# Patient Record
Sex: FEMALE | Race: WHITE | NOT HISPANIC OR LATINO | Employment: UNEMPLOYED | URBAN - METROPOLITAN AREA
[De-identification: names, ages, dates, MRNs, and addresses within clinical notes are randomized per-mention and may not be internally consistent; named-entity substitution may affect disease eponyms.]

---

## 2020-09-01 ENCOUNTER — HOSPITAL ENCOUNTER (OUTPATIENT)
Dept: RADIOLOGY | Facility: HOSPITAL | Age: 45
Discharge: HOME | End: 2020-09-01
Attending: RADIOLOGY

## 2020-09-01 DIAGNOSIS — Z76.89 REFERRAL OF PATIENT WITHOUT EXAMINATION OR TREATMENT: ICD-10-CM

## 2020-09-08 ENCOUNTER — HOSPITAL ENCOUNTER (EMERGENCY)
Facility: HOSPITAL | Age: 45
Discharge: HOME | End: 2020-09-08
Attending: EMERGENCY MEDICINE | Admitting: EMERGENCY MEDICINE
Payer: COMMERCIAL

## 2020-09-08 ENCOUNTER — TELEPHONE (OUTPATIENT)
Dept: OBGYN CLINIC | Facility: CLINIC | Age: 45
End: 2020-09-08

## 2020-09-08 VITALS
HEART RATE: 75 BPM | TEMPERATURE: 97.9 F | DIASTOLIC BLOOD PRESSURE: 71 MMHG | SYSTOLIC BLOOD PRESSURE: 122 MMHG | RESPIRATION RATE: 16 BRPM | OXYGEN SATURATION: 99 % | WEIGHT: 190 LBS

## 2020-09-08 DIAGNOSIS — R42 POSITIONAL LIGHTHEADEDNESS: ICD-10-CM

## 2020-09-08 DIAGNOSIS — N93.9 ABNORMAL VAGINAL BLEEDING: Primary | ICD-10-CM

## 2020-09-08 LAB
ALBUMIN SERPL BCP-MCNC: 3.8 G/DL (ref 3.5–5)
ALP SERPL-CCNC: 68 U/L (ref 46–116)
ALT SERPL W P-5'-P-CCNC: 18 U/L (ref 12–78)
ANION GAP SERPL CALCULATED.3IONS-SCNC: 4 MMOL/L (ref 4–13)
APTT PPP: 25 SECONDS (ref 23–37)
AST SERPL W P-5'-P-CCNC: 9 U/L (ref 5–45)
ATRIAL RATE: 79 BPM
BACTERIA UR QL AUTO: NORMAL /HPF
BASOPHILS # BLD AUTO: 0.06 THOUSANDS/ΜL (ref 0–0.1)
BASOPHILS NFR BLD AUTO: 1 % (ref 0–1)
BILIRUB DIRECT SERPL-MCNC: <0.05 MG/DL (ref 0–0.2)
BILIRUB SERPL-MCNC: 0.26 MG/DL (ref 0.2–1)
BILIRUB UR QL STRIP: NEGATIVE
BUN SERPL-MCNC: 10 MG/DL (ref 5–25)
CALCIUM SERPL-MCNC: 9 MG/DL (ref 8.3–10.1)
CHLORIDE SERPL-SCNC: 112 MMOL/L (ref 100–108)
CLARITY UR: CLEAR
CO2 SERPL-SCNC: 24 MMOL/L (ref 21–32)
COLOR UR: YELLOW
COLOR, POC: NORMAL
CREAT SERPL-MCNC: 0.65 MG/DL (ref 0.6–1.3)
EOSINOPHIL # BLD AUTO: 0.12 THOUSAND/ΜL (ref 0–0.61)
EOSINOPHIL NFR BLD AUTO: 2 % (ref 0–6)
ERYTHROCYTE [DISTWIDTH] IN BLOOD BY AUTOMATED COUNT: 24.3 % (ref 11.6–15.1)
EXT PREG TEST URINE: NEGATIVE
EXT. CONTROL ED NAV: NORMAL
GFR SERPL CREATININE-BSD FRML MDRD: 108 ML/MIN/1.73SQ M
GLUCOSE SERPL-MCNC: 87 MG/DL (ref 65–140)
GLUCOSE UR STRIP-MCNC: NEGATIVE MG/DL
HCT VFR BLD AUTO: 32.6 % (ref 34.8–46.1)
HGB BLD-MCNC: 9.9 G/DL (ref 11.5–15.4)
HGB UR QL STRIP.AUTO: ABNORMAL
HYALINE CASTS #/AREA URNS LPF: NORMAL /LPF
IMM GRANULOCYTES # BLD AUTO: 0.02 THOUSAND/UL (ref 0–0.2)
IMM GRANULOCYTES NFR BLD AUTO: 0 % (ref 0–2)
INR PPP: 0.99 (ref 0.84–1.19)
KETONES UR STRIP-MCNC: NEGATIVE MG/DL
LEUKOCYTE ESTERASE UR QL STRIP: NEGATIVE
LIPASE SERPL-CCNC: 67 U/L (ref 73–393)
LYMPHOCYTES # BLD AUTO: 1.62 THOUSANDS/ΜL (ref 0.6–4.47)
LYMPHOCYTES NFR BLD AUTO: 22 % (ref 14–44)
MAGNESIUM SERPL-MCNC: 2.1 MG/DL (ref 1.6–2.6)
MCH RBC QN AUTO: 25.4 PG (ref 26.8–34.3)
MCHC RBC AUTO-ENTMCNC: 30.4 G/DL (ref 31.4–37.4)
MCV RBC AUTO: 84 FL (ref 82–98)
MONOCYTES # BLD AUTO: 0.63 THOUSAND/ΜL (ref 0.17–1.22)
MONOCYTES NFR BLD AUTO: 9 % (ref 4–12)
NEUTROPHILS # BLD AUTO: 5 THOUSANDS/ΜL (ref 1.85–7.62)
NEUTS SEG NFR BLD AUTO: 66 % (ref 43–75)
NITRITE UR QL STRIP: NEGATIVE
NON-SQ EPI CELLS URNS QL MICRO: NORMAL /HPF
NRBC BLD AUTO-RTO: 0 /100 WBCS
P AXIS: 67 DEGREES
PH UR STRIP.AUTO: 5.5 [PH] (ref 4.5–8)
PLATELET # BLD AUTO: 245 THOUSANDS/UL (ref 149–390)
PMV BLD AUTO: 10.7 FL (ref 8.9–12.7)
POTASSIUM SERPL-SCNC: 3.7 MMOL/L (ref 3.5–5.3)
PR INTERVAL: 108 MS
PROT SERPL-MCNC: 7.2 G/DL (ref 6.4–8.2)
PROT UR STRIP-MCNC: NEGATIVE MG/DL
PROTHROMBIN TIME: 13.1 SECONDS (ref 11.6–14.5)
QRS AXIS: 86 DEGREES
QRSD INTERVAL: 86 MS
QT INTERVAL: 360 MS
QTC INTERVAL: 412 MS
RBC # BLD AUTO: 3.89 MILLION/UL (ref 3.81–5.12)
RBC #/AREA URNS AUTO: NORMAL /HPF
SODIUM SERPL-SCNC: 140 MMOL/L (ref 136–145)
SP GR UR STRIP.AUTO: <=1.005 (ref 1–1.03)
T WAVE AXIS: 55 DEGREES
UROBILINOGEN UR QL STRIP.AUTO: 0.2 E.U./DL
VENTRICULAR RATE: 79 BPM
WBC # BLD AUTO: 7.45 THOUSAND/UL (ref 4.31–10.16)
WBC #/AREA URNS AUTO: NORMAL /HPF

## 2020-09-08 PROCEDURE — 83735 ASSAY OF MAGNESIUM: CPT | Performed by: EMERGENCY MEDICINE

## 2020-09-08 PROCEDURE — 96374 THER/PROPH/DIAG INJ IV PUSH: CPT

## 2020-09-08 PROCEDURE — 99284 EMERGENCY DEPT VISIT MOD MDM: CPT | Performed by: EMERGENCY MEDICINE

## 2020-09-08 PROCEDURE — 81001 URINALYSIS AUTO W/SCOPE: CPT

## 2020-09-08 PROCEDURE — 85730 THROMBOPLASTIN TIME PARTIAL: CPT | Performed by: EMERGENCY MEDICINE

## 2020-09-08 PROCEDURE — 80076 HEPATIC FUNCTION PANEL: CPT | Performed by: EMERGENCY MEDICINE

## 2020-09-08 PROCEDURE — 96375 TX/PRO/DX INJ NEW DRUG ADDON: CPT

## 2020-09-08 PROCEDURE — 83690 ASSAY OF LIPASE: CPT | Performed by: EMERGENCY MEDICINE

## 2020-09-08 PROCEDURE — 85025 COMPLETE CBC W/AUTO DIFF WBC: CPT | Performed by: EMERGENCY MEDICINE

## 2020-09-08 PROCEDURE — 96361 HYDRATE IV INFUSION ADD-ON: CPT

## 2020-09-08 PROCEDURE — 99284 EMERGENCY DEPT VISIT MOD MDM: CPT

## 2020-09-08 PROCEDURE — 80048 BASIC METABOLIC PNL TOTAL CA: CPT | Performed by: EMERGENCY MEDICINE

## 2020-09-08 PROCEDURE — 93010 ELECTROCARDIOGRAM REPORT: CPT | Performed by: INTERNAL MEDICINE

## 2020-09-08 PROCEDURE — 85610 PROTHROMBIN TIME: CPT | Performed by: EMERGENCY MEDICINE

## 2020-09-08 PROCEDURE — 81025 URINE PREGNANCY TEST: CPT | Performed by: EMERGENCY MEDICINE

## 2020-09-08 PROCEDURE — 93005 ELECTROCARDIOGRAM TRACING: CPT

## 2020-09-08 PROCEDURE — 36415 COLL VENOUS BLD VENIPUNCTURE: CPT | Performed by: EMERGENCY MEDICINE

## 2020-09-08 RX ORDER — KETOROLAC TROMETHAMINE 30 MG/ML
15 INJECTION, SOLUTION INTRAMUSCULAR; INTRAVENOUS ONCE
Status: COMPLETED | OUTPATIENT
Start: 2020-09-08 | End: 2020-09-08

## 2020-09-08 RX ADMIN — SODIUM CHLORIDE 1000 ML: 0.9 INJECTION, SOLUTION INTRAVENOUS at 11:34

## 2020-09-08 RX ADMIN — TRANEXAMIC ACID 1000 MG: 1 INJECTION, SOLUTION INTRAVENOUS at 14:08

## 2020-09-08 RX ADMIN — KETOROLAC TROMETHAMINE 15 MG: 30 INJECTION, SOLUTION INTRAMUSCULAR at 11:34

## 2020-09-08 NOTE — ED ATTENDING ATTESTATION
Final Diagnosis:  1  Abnormal vaginal bleeding    2  Positional lightheadedness      ED Course as of Sep 08 1640   Tue Sep 08, 2020   1321 Case was discussed with OBGYN who suggested TXA IV bolus, +/- depot shot, f/u in clinic  Caio Kimball MD, saw and evaluated the patient  All available labs and X-rays were ordered by me or the resident and have been reviewed by myself  I discussed the patient with the resident / non-physician and agree with the resident's / non-physician practitioner's findings and plan as documented in the resident's / non-physician practicitioner's note, except where noted  At this point, I agree with the current assessment done in the ED  I was present during key portions of all procedures performed unless otherwise stated  Chief Complaint   Patient presents with    Vaginal Bleeding     Pt reports vaginial bleeding x47 days  Pt reports she has been on multiple medications to stop the bleeding that have been unsuccessful  Pt reprots the bleeding has been very heavy  This is a 41 y o  female presenting for evaluation of vaginal bleeding  Since the birth of her child 3 years ago she has been having irregular / heavy menses  For the last x47 days she has been having heavy vaginal bleeding, going through tabs frequently multiple times a day  She had a biopsy planned for today but came here instead  No blood thinners  No family hx of bleeding disorders  No easy bleeding  Denies any urinary tract infection symptoms (burning, itching, pain, blood, frequency)  Denies any upper respiratory tract infection symptoms (cough, congestion, rhinorrhea, sore throat)  Feels generalized abdominal craming  Currently on day 6 of progestin  She was on OCPs and before that TXA/Lysteda  Recent U/S of uterus that demosntrated thickened endometrial stripe without masses (per patient)  +SOB (mild)  +LH    PMH:   has a past medical history of Anemia      PSH:   has no past surgical history on file  Social:  Social History     Substance and Sexual Activity   Alcohol Use Never    Frequency: Never     Social History     Tobacco Use   Smoking Status Current Every Day Smoker    Packs/day: 1 00   Smokeless Tobacco Never Used     Social History     Substance and Sexual Activity   Drug Use Yes    Types: Marijuana    Comment: Medical     PE:  Vitals:    09/08/20 1200 09/08/20 1245 09/08/20 1300 09/08/20 1400   BP: 124/76  132/80 122/71   Pulse: 77 80 71 75   Resp: 14 20 16 16   Temp:       TempSrc:       SpO2: 99%  98% 99%   Weight:       General: VSS, NAD, awake, alert  Well-nourished, well-developed  Appears stated age  Head: Normocephalic, atraumatic, nontender  Eyes: PERRL, EOM-I  No diplopia  No hyphema  No subconjunctival hemorrhages  Symmetrical lids  ENTAtraumatic external nose and ears  MMM  No stridor  Normal phonation  No drooling  Base of mouth is soft  No mastoid tenderness  Neck: Symmetric, trachea midline  No JVD  CV: Peripheral pulses +2 throughout  No chest wall tenderness  Lungs:   Unlabored   No retractions  No crepitus  No tachypnea  No paradoxical motion  Abd: +BS, soft, diffuse mild tenderness worst in the LLQ   ND  No guarding  No rigidity  No rebound  No peritoneal signs  Heel strike negative  MSK:   FROM   No lower extremity edema  Back:   No CVAT  Skin: Dry, intact  Neuro: AAOx3, GCS 15, CN II-XII grossly intact  Motor grossly intact  Psychiatric/Behavioral: Appropriate mood and affect   Exam: deferred  A:  - Menorrhagia  - LH  - SOB  - Tachycardia  P:  - EKG  - basic labs for dizziness  - Pregnancy test   - Talk to GYN   - 13 point ROS was performed and all are normal unless stated in the history above  - Nursing note reviewed  Vitals reviewed  - Orders placed by myself and/or advanced practitioner / resident     - Previous chart was reviewed  - No language barrier    - History obtained from patient     - There are no limitations to the history obtained     - Critical care time:     Code Status: No Order  Advance Directive and Living Will:      Power of :    POLST:      Medications   sodium chloride 0 9 % bolus 1,000 mL (0 mL Intravenous Stopped 9/8/20 1258)   ketorolac (TORADOL) injection 15 mg (15 mg Intravenous Given 9/8/20 1134)   tranexamic Acid 1,000 mg in sodium chloride 0 9 % 100 mL IVPB Loading Dose (0 mg Intravenous Stopped 9/8/20 1418)     No orders to display     Orders Placed This Encounter   Procedures    CBC and differential    Basic metabolic panel    Magnesium    Protime-INR    APTT    Hepatic function panel    Lipase    Urine Microscopic    Insert peripheral IV    POCT pregnancy, urine    POCT urinalysis dipstick    ECG 12 lead    ABORh Recheck - Contact Blood Bank Prior to Collection     Labs Reviewed   CBC AND DIFFERENTIAL - Abnormal       Result Value Ref Range Status    WBC 7 45  4 31 - 10 16 Thousand/uL Final    RBC 3 89  3 81 - 5 12 Million/uL Final    Hemoglobin 9 9 (*) 11 5 - 15 4 g/dL Final    Hematocrit 32 6 (*) 34 8 - 46 1 % Final    MCV 84  82 - 98 fL Final    MCH 25 4 (*) 26 8 - 34 3 pg Final    MCHC 30 4 (*) 31 4 - 37 4 g/dL Final    RDW 24 3 (*) 11 6 - 15 1 % Final    MPV 10 7  8 9 - 12 7 fL Final    Platelets 577  895 - 390 Thousands/uL Final    nRBC 0  /100 WBCs Final    Neutrophils Relative 66  43 - 75 % Final    Immat GRANS % 0  0 - 2 % Final    Lymphocytes Relative 22  14 - 44 % Final    Monocytes Relative 9  4 - 12 % Final    Eosinophils Relative 2  0 - 6 % Final    Basophils Relative 1  0 - 1 % Final    Neutrophils Absolute 5 00  1 85 - 7 62 Thousands/µL Final    Immature Grans Absolute 0 02  0 00 - 0 20 Thousand/uL Final    Lymphocytes Absolute 1 62  0 60 - 4 47 Thousands/µL Final    Monocytes Absolute 0 63  0 17 - 1 22 Thousand/µL Final    Eosinophils Absolute 0 12  0 00 - 0 61 Thousand/µL Final    Basophils Absolute 0 06  0 00 - 0 10 Thousands/µL Final BASIC METABOLIC PANEL - Abnormal    Sodium 140  136 - 145 mmol/L Final    Potassium 3 7  3 5 - 5 3 mmol/L Final    Chloride 112 (*) 100 - 108 mmol/L Final    CO2 24  21 - 32 mmol/L Final    ANION GAP 4  4 - 13 mmol/L Final    BUN 10  5 - 25 mg/dL Final    Creatinine 0 65  0 60 - 1 30 mg/dL Final    Comment: Standardized to IDMS reference method    Glucose 87  65 - 140 mg/dL Final    Comment: If the patient is fasting, the ADA then defines impaired fasting glucose as > 100 mg/dL and diabetes as > or equal to 123 mg/dL  Specimen collection should occur prior to Sulfasalazine administration due to the potential for falsely depressed results  Specimen collection should occur prior to Sulfapyridine administration due to the potential for falsely elevated results      Calcium 9 0  8 3 - 10 1 mg/dL Final    eGFR 108  ml/min/1 73sq m Final    Narrative:     [place] guidelines for Chronic Kidney Disease (CKD):     Stage 1 with normal or high GFR (GFR > 90 mL/min/1 73 square meters)    Stage 2 Mild CKD (GFR = 60-89 mL/min/1 73 square meters)    Stage 3A Moderate CKD (GFR = 45-59 mL/min/1 73 square meters)    Stage 3B Moderate CKD (GFR = 30-44 mL/min/1 73 square meters)    Stage 4 Severe CKD (GFR = 15-29 mL/min/1 73 square meters)    Stage 5 End Stage CKD (GFR <15 mL/min/1 73 square meters)  Note: GFR calculation is accurate only with a steady state creatinine   LIPASE - Abnormal    Lipase 67 (*) 73 - 393 u/L Final   URINE MACROSCOPIC, POC - Abnormal    Color, UA Yellow   Final    Clarity, UA Clear   Final    pH, UA 5 5  4 5 - 8 0 Final    Leukocytes, UA Negative  Negative Final    Nitrite, UA Negative  Negative Final    Protein, UA Negative  Negative mg/dl Final    Glucose, UA Negative  Negative mg/dl Final    Ketones, UA Negative  Negative mg/dl Final    Urobilinogen, UA 0 2  0 2, 1 0 E U /dl E U /dl Final    Bilirubin, UA Negative  Negative Final    Blood, UA Moderate (*) Negative Final Specific Gravity, UA <=1 005  1 003 - 1 030 Final    Narrative:     CLINITEK RESULT   MAGNESIUM - Normal    Magnesium 2 1  1 6 - 2 6 mg/dL Final   PROTIME-INR - Normal    Protime 13 1  11 6 - 14 5 seconds Final    INR 0 99  0 84 - 1 19 Final   APTT - Normal    PTT 25  23 - 37 seconds Final    Comment: Therapeutic Heparin Range =  60-90 seconds   HEPATIC FUNCTION PANEL - Normal    Total Bilirubin 0 26  0 20 - 1 00 mg/dL Final    Comment: Use of this assay is not recommended for patients undergoing treatment with eltrombopag due to the potential for falsely elevated results  Bilirubin, Direct <0 05  0 00 - 0 20 mg/dL Final    Alkaline Phosphatase 68  46 - 116 U/L Final    AST 9  5 - 45 U/L Final    Comment: Specimen collection should occur prior to Sulfasalazine and/or Sulfapyridine administration due to the potential for falsely depressed results  ALT 18  12 - 78 U/L Final    Comment: Specimen collection should occur prior to Sulfasalazine and/or Sulfapyridine administration due to the potential for falsely depressed results  Total Protein 7 2  6 4 - 8 2 g/dL Final    Albumin 3 8  3 5 - 5 0 g/dL Final   URINE MICROSCOPIC - Normal    RBC, UA None Seen  None Seen, 0-5 /hpf Final    WBC, UA None Seen  None Seen, 0-5, 5-55, 5-65 /hpf Final    Epithelial Cells None Seen  None Seen, Occasional /hpf Final    Bacteria, UA None Seen  None Seen, Occasional /hpf Final    Hyaline Casts, UA None Seen  None Seen /lpf Final   POCT PREGNANCY, URINE - Normal    EXT PREG TEST UR (Ref: Negative) Negative   Final    Control Valid   Final   POCT URINALYSIS DIPSTICK - Normal    Color, UA      Final   [address] RECHECK     Time reflects when diagnosis was documented in both MDM as applicable and the Disposition within this note     Time User Action Codes Description Comment    9/8/2020 11:22 AM Sigrid Foot Add [N93 9] Abnormal vaginal bleeding     9/8/2020  2:15 PM Sigrid Foot Add [R42] Positional lightheadedness       ED Disposition ED Disposition Condition Date/Time Comment    Discharge Stable Tue Sep 8, 2020  2:36 PM David Sanjay discharge to home/self care  Follow-up Information     Follow up With Specialties Details Why [address] [facility] Follow up Follow up Elysia [address] [id number]  [address] [facility], [address], [place], [address]    [address]  Call  to establish care with a primary care provider [phone]       [address] [place] [address] Emergency Department Emergency Medicine Go to  If symptoms worsen [address] [facility] ED, [address], [place], [id number] [phone]        There are no discharge medications for this patient  No discharge procedures on file  None       Portions of the record may have been created with voice recognition software  Occasional wrong word or "sound a like" substitutions may have occurred due to the inherent limitations of voice recognition software  Read the chart carefully and recognize, using context, where substitutions have occurred      Electronically signed by:  Melissa Wilkinson

## 2020-09-08 NOTE — TELEPHONE ENCOUNTER
LM to call back BALDOMERO-KASIA to schedule appt for AUB and EMB as soon as possible  Per provider she was seen in the ED today

## 2020-09-08 NOTE — ED PROVIDER NOTES
History  Chief Complaint   Patient presents with    Vaginal Bleeding     Pt reports vaginial bleeding x47 days  Pt reports she has been on multiple medications to stop the bleeding that have been unsuccessful  Pt reprots the bleeding has been very heavy  43-year-old female no significant past history presenting with profuse vaginal bleeding  Patient reports she has always had abnormal uterine bleeding since the start of her menses but has gotten significantly worse in the past 3 years since the birth of her 2rd and last child  Patient reports has gotten worse over the past 6 weeks  She has seen gyn and a couple of office visits during this time and has tried combination OCPs, TXA, and now progesterone 10 mg which she is on day 6 of 10  She is supposed to undergo biopsy today but had worsening bleeding with shortness of breath and lightheadedness over the past 24 hours and gyn recommended ED evaluation  She reports going through a couple heavy pads every hour  She underwent an ultrasound within the past few weeks which did not display any polyps or fibroids but did note a thickened endometrial stripe  No family history of bleeding disorder and patient denies any history of easy bruising  She has not been worked up previously per her for bleeding disorder  Patient also reports generalized abdominal pain that is cramping in nature with occasional low back cramping and pain  No urinary complaints or history sexually transmitted diseases  She denies any other complaints at this time  She denies any headache, vision changes, chest pain, nausea/vomiting, fever/chills, or any bladder or bowel changes  None       Past Medical History:   Diagnosis Date    Anemia        History reviewed  No pertinent surgical history  History reviewed  No pertinent family history  I have reviewed and agree with the history as documented      E-Cigarette/Vaping     E-Cigarette/Vaping Substances     Social History Tobacco Use    Smoking status: Current Every Day Smoker     Packs/day: 1 00    Smokeless tobacco: Never Used   Substance Use Topics    Alcohol use: Never     Frequency: Never    Drug use: Yes     Types: Marijuana     Comment: Medical        Review of Systems   Constitutional: Negative for appetite change, chills, diaphoresis, fever and unexpected weight change  HENT: Negative for congestion and rhinorrhea  Eyes: Negative for photophobia and visual disturbance  Respiratory: Positive for shortness of breath  Negative for cough and chest tightness  Cardiovascular: Negative for chest pain, palpitations and leg swelling  Gastrointestinal: Positive for abdominal pain  Negative for abdominal distention, blood in stool, constipation, diarrhea, nausea and vomiting  Genitourinary: Positive for menstrual problem and vaginal bleeding  Negative for dysuria, hematuria, vaginal discharge and vaginal pain  Musculoskeletal: Negative for back pain, joint swelling, neck pain and neck stiffness  Skin: Positive for pallor  Negative for color change, rash and wound  Neurological: Positive for light-headedness  Negative for dizziness, syncope, weakness and headaches  Psychiatric/Behavioral: Negative for agitation  All other systems reviewed and are negative  Physical Exam  ED Triage Vitals   Temperature Pulse Respirations Blood Pressure SpO2   09/08/20 1111 09/08/20 1100 09/08/20 1100 09/08/20 1111 09/08/20 1100   97 9 °F (36 6 °C) (!) 106 16 130/77 100 %      Temp Source Heart Rate Source Patient Position - Orthostatic VS BP Location FiO2 (%)   09/08/20 1111 09/08/20 1100 -- -- --   Oral Monitor         Pain Score       09/08/20 1111       8             Orthostatic Vital Signs  Vitals:    09/08/20 1200 09/08/20 1245 09/08/20 1300 09/08/20 1400   BP: 124/76  132/80 122/71   Pulse: 77 80 71 75       Physical Exam  Vitals signs and nursing note reviewed     Constitutional:       Appearance: She is well-developed  She is not diaphoretic  HENT:      Head: Normocephalic and atraumatic  Nose: Nose normal    Eyes:      General:         Right eye: No discharge  Left eye: No discharge  Pupils: Pupils are equal, round, and reactive to light  Neck:      Musculoskeletal: Normal range of motion and neck supple  No neck rigidity or muscular tenderness  Vascular: No JVD  Trachea: No tracheal deviation  Cardiovascular:      Rate and Rhythm: Regular rhythm  Tachycardia present  Heart sounds: Normal heart sounds  No murmur  No friction rub  No gallop  Comments: Tachycardic to low 100s  Regular rhythm  Pulmonary:      Effort: Pulmonary effort is normal  No respiratory distress  Breath sounds: Normal breath sounds  No stridor  No wheezing or rales  Chest:      Chest wall: No tenderness  Abdominal:      General: Bowel sounds are normal  There is no distension  Palpations: Abdomen is soft  Tenderness: There is abdominal tenderness  There is guarding  There is no rebound  Comments: Bowel sounds normal   Abdomen soft  Non peritoneal   Diffuse mild tenderness palpation with moderate tenderness palpation and guarding left lower quadrant  Musculoskeletal: Normal range of motion  General: No tenderness or deformity  Lymphadenopathy:      Cervical: No cervical adenopathy  Skin:     General: Skin is warm and dry  Coloration: Skin is not pale  Findings: No erythema or rash  Neurological:      General: No focal deficit present  Mental Status: She is alert and oriented to person, place, and time  Mental status is at baseline  Cranial Nerves: No cranial nerve deficit  Sensory: No sensory deficit  Motor: No weakness or abnormal muscle tone  Coordination: Coordination normal    Psychiatric:         Behavior: Behavior normal          Thought Content:  Thought content normal          ED Medications  Medications   sodium chloride 0 9 % bolus 1,000 mL (0 mL Intravenous Stopped 9/8/20 1258)   ketorolac (TORADOL) injection 15 mg (15 mg Intravenous Given 9/8/20 1134)   tranexamic Acid 1,000 mg in sodium chloride 0 9 % 100 mL IVPB Loading Dose (0 mg Intravenous Stopped 9/8/20 1418)       Diagnostic Studies  Results Reviewed     Procedure Component Value Units Date/Time    Urine Microscopic [[id number]]  (Normal) Collected:  09/08/20 1138    Lab Status:  Final result Specimen:  Urine, Clean Catch Updated:  09/08/20 1411     RBC, UA None Seen /hpf      WBC, UA None Seen /hpf      Epithelial Cells None Seen /hpf      Bacteria, UA None Seen /hpf      Hyaline Casts, UA None Seen /lpf     Protime-INR [[id number]]  (Normal) Collected:  09/08/20 1259    Lab Status:  Final result Specimen:  Blood from Arm, Right Updated:  09/08/20 1332     Protime 13 1 seconds      INR 0 99    APTT [[id number]]  (Normal) Collected:  09/08/20 1259    Lab Status:  Final result Specimen:  Blood from Arm, Right Updated:  09/08/20 1332     PTT 25 seconds     Basic metabolic panel [[id number]]  (Abnormal) Collected:  09/08/20 1130    Lab Status:  Final result Specimen:  Blood from Arm, Right Updated:  09/08/20 1223     Sodium 140 mmol/L      Potassium 3 7 mmol/L      Chloride 112 mmol/L      CO2 24 mmol/L      ANION GAP 4 mmol/L      BUN 10 mg/dL      Creatinine 0 65 mg/dL      Glucose 87 mg/dL      Calcium 9 0 mg/dL      eGFR 108 ml/min/1 73sq m     Narrative:       [place] guidelines for Chronic Kidney Disease (CKD):     Stage 1 with normal or high GFR (GFR > 90 mL/min/1 73 square meters)    Stage 2 Mild CKD (GFR = 60-89 mL/min/1 73 square meters)    Stage 3A Moderate CKD (GFR = 45-59 mL/min/1 73 square meters)    Stage 3B Moderate CKD (GFR = 30-44 mL/min/1 73 square meters)    Stage 4 Severe CKD (GFR = 15-29 mL/min/1 73 square meters)    Stage 5 End Stage CKD (GFR <15 mL/min/1 73 square meters)  Note: GFR calculation is accurate only with a steady state creatinine    Magnesium [[id number]]  (Normal) Collected:  09/08/20 1130    Lab Status:  Final result Specimen:  Blood from Arm, Right Updated:  09/08/20 1223     Magnesium 2 1 mg/dL     Hepatic function panel [[id number]]  (Normal) Collected:  09/08/20 1130    Lab Status:  Final result Specimen:  Blood from Arm, Right Updated:  09/08/20 1223     Total Bilirubin 0 26 mg/dL      Bilirubin, Direct <0 05 mg/dL      Alkaline Phosphatase 68 U/L      AST 9 U/L      ALT 18 U/L      Total Protein 7 2 g/dL      Albumin 3 8 g/dL     Lipase [[id number]]  (Abnormal) Collected:  09/08/20 1130    Lab Status:  Final result Specimen:  Blood from Arm, Right Updated:  09/08/20 1223     Lipase 67 u/L     CBC and differential [[id number]]  (Abnormal) Collected:  09/08/20 1130    Lab Status:  Final result Specimen:  Blood from Arm, Right Updated:  09/08/20 1156     WBC 7 45 Thousand/uL      RBC 3 89 Million/uL      Hemoglobin 9 9 g/dL      Hematocrit 32 6 %      MCV 84 fL      MCH 25 4 pg      MCHC 30 4 g/dL      RDW 24 3 %      MPV 10 7 fL      Platelets 581 Thousands/uL      nRBC 0 /100 WBCs      Neutrophils Relative 66 %      Immat GRANS % 0 %      Lymphocytes Relative 22 %      Monocytes Relative 9 %      Eosinophils Relative 2 %      Basophils Relative 1 %      Neutrophils Absolute 5 00 Thousands/µL      Immature Grans Absolute 0 02 Thousand/uL      Lymphocytes Absolute 1 62 Thousands/µL      Monocytes Absolute 0 63 Thousand/µL      Eosinophils Absolute 0 12 Thousand/µL      Basophils Absolute 0 06 Thousands/µL     Urine Macroscopic, POC [[id number]]  (Abnormal) Collected:  09/08/20 1138    Lab Status:  Final result Specimen:  Urine Updated:  09/08/20 1140     Color, UA Yellow     Clarity, UA Clear     pH, UA 5 5     Leukocytes, UA Negative     Nitrite, UA Negative     Protein, UA Negative mg/dl      Glucose, UA Negative mg/dl      Ketones, UA Negative mg/dl      Urobilinogen, UA 0 2 E U /dl      Bilirubin, UA Negative     Blood, UA Moderate     Specific Gravity, UA <=1 005    Narrative:       CLINITEK RESULT    POCT urinalysis dipstick [[id number]]  (Normal) Resulted:  09/08/20 1140    Lab Status:  Final result Specimen:  Urine Updated:  09/08/20 1140     Color, UA   POCT pregnancy, urine [[id number]]  (Normal) Resulted:  09/08/20 1140    Lab Status:  Final result Updated:  09/08/20 1140     EXT PREG TEST UR (Ref: Negative) Negative     Control Valid                 No orders to display         Procedures  Procedures      ED Course                                       MDM  Number of Diagnoses or Management Options  Abnormal vaginal bleeding:   Positional lightheadedness:   Diagnosis management comments: 28-year-old female no significant past history presenting with profuse vaginal bleeding  History of abnormal menstruation with worsening over the past 2 days despite gyn intervention and noted history of bleeding disorder  The patient being symptomatic likely from blood loss and given pallor on exam, plan to evaluate for acute blood loss anemia including CBC, EKG  Fluids and Toradol  Plan to touch base with patient's gyn  Reassess  Hemoglobin stable at 9 9 with reported previous hemoglobin of 10  Some improvement after fluids and pain medication  Patient refusing any additional pain medication or fluid  EKG no acute process  Attempted to discuss with patient's gyn that she had scheduled appointment with for today, gyn had not previously seen patient was unable to provide recommendations  Unable to reach prescribing gyn of current medication  Discussed case with [address] [place] [address] gyn who recommended IV TXA and possible Depo shot  Patient unable to IV TXA but wanted to stick with current oral regimen and refused Depo shot  Follow up appointment scheduling in process with [address] [place] [address] gyn  Recommended follow-up with gyn and primary care provider  All questions answered  Given instructions on return precautions  Patient acknowledged understanding of all written and verbal instructions and return precautions  Discharge  Amount and/or Complexity of Data Reviewed  Clinical lab tests: ordered and reviewed  Tests in the radiology section of CPT®: ordered and reviewed  Tests in the medicine section of CPT®: ordered and reviewed  Review and summarize past medical records: yes  Independent visualization of images, tracings, or specimens: yes    Risk of Complications, Morbidity, and/or Mortality  Presenting problems: moderate  Diagnostic procedures: low  Management options: low    Patient Progress  Patient progress: improved        Disposition  Final diagnoses:   Abnormal vaginal bleeding   Positional lightheadedness     Time reflects when diagnosis was documented in both MDM as applicable and the Disposition within this note     Time User Action Codes Description Comment    9/8/2020 11:22 AM Phil Plant Add [N93 9] Abnormal vaginal bleeding     9/8/2020  2:15 PM Phil Plant Add [R42] Positional lightheadedness       ED Disposition     ED Disposition Condition Date/Time Comment    Discharge Stable Tue Sep 8, 2020  2:36 PM Destiny Yang discharge to home/self care  Follow-up Information     Follow up With Specialties Details Why [facility] Obstetrics and Gynecology Follow up Follow up [address] [id number]  [address] [place] [address], [address], 55 [facility]    [address]  Call  to establish care with a primary care provider [phone]       [address] [place] [address] Emergency Department Emergency Medicine Go to  If symptoms worsen [facility], [address], [address]   [phone]          There are no discharge medications for this patient      No discharge procedures on file     PDMP Review     None           ED Provider  Attending physically available and evaluated [facility]  I managed the patient along with the ED Attending      Electronically Signed by         Juan Alejandro MD  09/08/20 9501

## 2020-09-08 NOTE — ED NOTES
Pt ambulated to bathroom without assistance and provided with new pads     Serafin Urbina RN  09/08/20 8153

## 2020-09-08 NOTE — DISCHARGE INSTRUCTIONS
You were given IV medication to assist with the increased bleeding  Please continue taking your progesterone as prescribed  Please call the low number for [facility] with any additional pain or bleeding concerns and for scheduling follow-up appointment  Please return to the ED for severe shortness of breath, severe chest pain, significantly increased bleeding, fainting, or any other concerning signs or symptoms  Please follow-up with the OBGYN and your primary care doctor  Please refer to the following documents for additional instructions and return precautions

## 2020-09-09 ENCOUNTER — LAB REQUISITION (OUTPATIENT)
Dept: LAB | Facility: HOSPITAL | Age: 45
End: 2020-09-09
Payer: COMMERCIAL

## 2020-09-09 DIAGNOSIS — N92.1 EXCESSIVE AND FREQUENT MENSTRUATION WITH IRREGULAR CYCLE: ICD-10-CM

## 2020-09-09 PROCEDURE — 88305 TISSUE EXAM BY PATHOLOGIST: CPT | Performed by: PATHOLOGY

## 2020-09-11 ENCOUNTER — HOSPITAL ENCOUNTER (EMERGENCY)
Facility: HOSPITAL | Age: 45
Discharge: HOME | End: 2020-09-11
Attending: EMERGENCY MEDICINE | Admitting: EMERGENCY MEDICINE
Payer: COMMERCIAL

## 2020-09-11 ENCOUNTER — APPOINTMENT (EMERGENCY)
Dept: CT IMAGING | Facility: HOSPITAL | Age: 45
End: 2020-09-11
Payer: COMMERCIAL

## 2020-09-11 VITALS
WEIGHT: 192.9 LBS | SYSTOLIC BLOOD PRESSURE: 98 MMHG | RESPIRATION RATE: 18 BRPM | TEMPERATURE: 98.1 F | DIASTOLIC BLOOD PRESSURE: 57 MMHG | OXYGEN SATURATION: 99 % | HEART RATE: 68 BPM

## 2020-09-11 DIAGNOSIS — D36.9 ADENOMA: ICD-10-CM

## 2020-09-11 DIAGNOSIS — N83.202 LEFT OVARIAN CYST: ICD-10-CM

## 2020-09-11 DIAGNOSIS — N93.8 DYSFUNCTIONAL UTERINE BLEEDING: Primary | ICD-10-CM

## 2020-09-11 LAB
ABO GROUP BLD: NORMAL
ABO GROUP BLD: NORMAL
ALBUMIN SERPL BCP-MCNC: 3.4 G/DL (ref 3.5–5)
ALP SERPL-CCNC: 62 U/L (ref 46–116)
ALT SERPL W P-5'-P-CCNC: 19 U/L (ref 12–78)
ANION GAP SERPL CALCULATED.3IONS-SCNC: 11 MMOL/L (ref 4–13)
APTT PPP: 28 SECONDS (ref 23–37)
AST SERPL W P-5'-P-CCNC: 8 U/L (ref 5–45)
BACTERIA UR QL AUTO: ABNORMAL /HPF
BASOPHILS # BLD AUTO: 0.05 THOUSANDS/ΜL (ref 0–0.1)
BASOPHILS NFR BLD AUTO: 1 % (ref 0–1)
BILIRUB SERPL-MCNC: 0.13 MG/DL (ref 0.2–1)
BILIRUB UR QL STRIP: NEGATIVE
BLD GP AB SCN SERPL QL: NEGATIVE
BUN SERPL-MCNC: 11 MG/DL (ref 5–25)
CALCIUM SERPL-MCNC: 8.1 MG/DL (ref 8.3–10.1)
CHLORIDE SERPL-SCNC: 108 MMOL/L (ref 100–108)
CLARITY UR: CLEAR
CO2 SERPL-SCNC: 21 MMOL/L (ref 21–32)
COLOR UR: YELLOW
CREAT SERPL-MCNC: 0.63 MG/DL (ref 0.6–1.3)
EOSINOPHIL # BLD AUTO: 0.14 THOUSAND/ΜL (ref 0–0.61)
EOSINOPHIL NFR BLD AUTO: 2 % (ref 0–6)
ERYTHROCYTE [DISTWIDTH] IN BLOOD BY AUTOMATED COUNT: 23.1 % (ref 11.6–15.1)
EXT PREG TEST URINE: NORMAL
EXT. CONTROL ED NAV: NORMAL
GFR SERPL CREATININE-BSD FRML MDRD: 109 ML/MIN/1.73SQ M
GLUCOSE SERPL-MCNC: 93 MG/DL (ref 65–140)
GLUCOSE UR STRIP-MCNC: NEGATIVE MG/DL
HCT VFR BLD AUTO: 30.1 % (ref 34.8–46.1)
HGB BLD-MCNC: 8.9 G/DL (ref 11.5–15.4)
HGB UR QL STRIP.AUTO: ABNORMAL
IMM GRANULOCYTES # BLD AUTO: 0.01 THOUSAND/UL (ref 0–0.2)
IMM GRANULOCYTES NFR BLD AUTO: 0 % (ref 0–2)
INR PPP: 1.03 (ref 0.84–1.19)
KETONES UR STRIP-MCNC: NEGATIVE MG/DL
LEUKOCYTE ESTERASE UR QL STRIP: NEGATIVE
LIPASE SERPL-CCNC: 87 U/L (ref 73–393)
LYMPHOCYTES # BLD AUTO: 1.69 THOUSANDS/ΜL (ref 0.6–4.47)
LYMPHOCYTES NFR BLD AUTO: 29 % (ref 14–44)
MCH RBC QN AUTO: 25.4 PG (ref 26.8–34.3)
MCHC RBC AUTO-ENTMCNC: 29.6 G/DL (ref 31.4–37.4)
MCV RBC AUTO: 86 FL (ref 82–98)
MONOCYTES # BLD AUTO: 0.55 THOUSAND/ΜL (ref 0.17–1.22)
MONOCYTES NFR BLD AUTO: 9 % (ref 4–12)
NEUTROPHILS # BLD AUTO: 3.4 THOUSANDS/ΜL (ref 1.85–7.62)
NEUTS SEG NFR BLD AUTO: 59 % (ref 43–75)
NITRITE UR QL STRIP: NEGATIVE
NON-SQ EPI CELLS URNS QL MICRO: ABNORMAL /HPF
NRBC BLD AUTO-RTO: 0 /100 WBCS
PH UR STRIP.AUTO: 5.5 [PH] (ref 4.5–8)
PLATELET # BLD AUTO: 252 THOUSANDS/UL (ref 149–390)
PMV BLD AUTO: 10.3 FL (ref 8.9–12.7)
POTASSIUM SERPL-SCNC: 4 MMOL/L (ref 3.5–5.3)
PROT SERPL-MCNC: 7.1 G/DL (ref 6.4–8.2)
PROT UR STRIP-MCNC: NEGATIVE MG/DL
PROTHROMBIN TIME: 13.3 SECONDS (ref 11.6–14.5)
RBC # BLD AUTO: 3.5 MILLION/UL (ref 3.81–5.12)
RBC #/AREA URNS AUTO: ABNORMAL /HPF
RH BLD: POSITIVE
RH BLD: POSITIVE
SODIUM SERPL-SCNC: 140 MMOL/L (ref 136–145)
SP GR UR STRIP.AUTO: 1.01 (ref 1–1.03)
SPECIMEN EXPIRATION DATE: NORMAL
UROBILINOGEN UR QL STRIP.AUTO: 0.2 E.U./DL
WBC # BLD AUTO: 5.84 THOUSAND/UL (ref 4.31–10.16)
WBC #/AREA URNS AUTO: ABNORMAL /HPF

## 2020-09-11 PROCEDURE — 96374 THER/PROPH/DIAG INJ IV PUSH: CPT

## 2020-09-11 PROCEDURE — P9016 RBC LEUKOCYTES REDUCED: HCPCS

## 2020-09-11 PROCEDURE — 96375 TX/PRO/DX INJ NEW DRUG ADDON: CPT

## 2020-09-11 PROCEDURE — 86900 BLOOD TYPING SEROLOGIC ABO: CPT | Performed by: PHYSICIAN ASSISTANT

## 2020-09-11 PROCEDURE — 96361 HYDRATE IV INFUSION ADD-ON: CPT

## 2020-09-11 PROCEDURE — 74177 CT ABD & PELVIS W/CONTRAST: CPT

## 2020-09-11 PROCEDURE — 86901 BLOOD TYPING SEROLOGIC RH(D): CPT | Performed by: PHYSICIAN ASSISTANT

## 2020-09-11 PROCEDURE — 81001 URINALYSIS AUTO W/SCOPE: CPT

## 2020-09-11 PROCEDURE — NC001 PR NO CHARGE: Performed by: PHYSICIAN ASSISTANT

## 2020-09-11 PROCEDURE — 86850 RBC ANTIBODY SCREEN: CPT | Performed by: PHYSICIAN ASSISTANT

## 2020-09-11 PROCEDURE — G1004 CDSM NDSC: HCPCS

## 2020-09-11 PROCEDURE — 36430 TRANSFUSION BLD/BLD COMPNT: CPT

## 2020-09-11 PROCEDURE — 99284 EMERGENCY DEPT VISIT MOD MDM: CPT

## 2020-09-11 PROCEDURE — 96376 TX/PRO/DX INJ SAME DRUG ADON: CPT

## 2020-09-11 PROCEDURE — 85025 COMPLETE CBC W/AUTO DIFF WBC: CPT | Performed by: PHYSICIAN ASSISTANT

## 2020-09-11 PROCEDURE — 86920 COMPATIBILITY TEST SPIN: CPT

## 2020-09-11 PROCEDURE — 80053 COMPREHEN METABOLIC PANEL: CPT | Performed by: PHYSICIAN ASSISTANT

## 2020-09-11 PROCEDURE — 85730 THROMBOPLASTIN TIME PARTIAL: CPT | Performed by: PHYSICIAN ASSISTANT

## 2020-09-11 PROCEDURE — NC001 PR NO CHARGE: Performed by: OBSTETRICS & GYNECOLOGY

## 2020-09-11 PROCEDURE — 81025 URINE PREGNANCY TEST: CPT | Performed by: PHYSICIAN ASSISTANT

## 2020-09-11 PROCEDURE — 85610 PROTHROMBIN TIME: CPT | Performed by: PHYSICIAN ASSISTANT

## 2020-09-11 PROCEDURE — 36415 COLL VENOUS BLD VENIPUNCTURE: CPT | Performed by: PHYSICIAN ASSISTANT

## 2020-09-11 PROCEDURE — 83690 ASSAY OF LIPASE: CPT | Performed by: PHYSICIAN ASSISTANT

## 2020-09-11 PROCEDURE — 99285 EMERGENCY DEPT VISIT HI MDM: CPT | Performed by: PHYSICIAN ASSISTANT

## 2020-09-11 RX ORDER — HYDROMORPHONE HCL/PF 1 MG/ML
1 SYRINGE (ML) INJECTION ONCE
Status: COMPLETED | OUTPATIENT
Start: 2020-09-11 | End: 2020-09-11

## 2020-09-11 RX ORDER — FERROUS SULFATE 325(65) MG
325 TABLET ORAL
COMMUNITY

## 2020-09-11 RX ORDER — DIPHENHYDRAMINE HCL 25 MG
25 TABLET ORAL ONCE
Status: DISCONTINUED | OUTPATIENT
Start: 2020-09-11 | End: 2020-09-11

## 2020-09-11 RX ORDER — DIPHENHYDRAMINE HYDROCHLORIDE 50 MG/ML
25 INJECTION INTRAMUSCULAR; INTRAVENOUS ONCE
Status: COMPLETED | OUTPATIENT
Start: 2020-09-11 | End: 2020-09-11

## 2020-09-11 RX ADMIN — HYDROMORPHONE HYDROCHLORIDE 1 MG: 1 INJECTION, SOLUTION INTRAMUSCULAR; INTRAVENOUS; SUBCUTANEOUS at 12:20

## 2020-09-11 RX ADMIN — IOHEXOL 100 ML: 350 INJECTION, SOLUTION INTRAVENOUS at 13:11

## 2020-09-11 RX ADMIN — CONJUGATED ESTROGENS 25 MG: 25 INJECTION, POWDER, LYOPHILIZED, FOR SOLUTION INTRAMUSCULAR; INTRAVENOUS at 14:49

## 2020-09-11 RX ADMIN — DIPHENHYDRAMINE HYDROCHLORIDE 25 MG: 50 INJECTION, SOLUTION INTRAMUSCULAR; INTRAVENOUS at 17:55

## 2020-09-11 RX ADMIN — SODIUM CHLORIDE 1000 ML: 0.9 INJECTION, SOLUTION INTRAVENOUS at 12:20

## 2020-09-11 RX ADMIN — SODIUM CHLORIDE 1000 ML: 0.9 INJECTION, SOLUTION INTRAVENOUS at 16:30

## 2020-09-11 RX ADMIN — HYDROMORPHONE HYDROCHLORIDE 1 MG: 1 INJECTION, SOLUTION INTRAMUSCULAR; INTRAVENOUS; SUBCUTANEOUS at 15:17

## 2020-09-11 NOTE — CONSULTS
Consultation - Gynecology  Keisha Parks 41 y o  female MRN: [id number]  Unit/Bed#: ED 18 Encounter: [id number]      Consults    Assessment/Plan     44yo w/ AUB and heavy vaginal bleeding  Scheduled for [facility] on 9/24  Hgb 9 9 -> 8 9  Unremarkable US in office earlier this week, without evidence of polyps or fibroids  Currently on Provera 10mg BID  Will give Premarin 25mg IV x1 and 1U pRBC in anticipation for surgery in 2 weeks  She declines overnight stay for observation  Plan for close f/u next week w/ Dr Raudel Lozoya  D/w Dr Raudel Lozoya  Chief Complaint   Patient presents with    Vaginal Bleeding     Pt reports 56 days of vaginal bleeding  Is scheduled to have a hysterectomy  States that today she is going through a tampon in 20 minutes with "golf-ball sized clots"  Reports pain in left lower abd/pelvis that radiates to back  Pain is worse with standing  History of Present Illness   Physician Requesting Consult: Ricardo Burnett  Reason for Consult / Principal Problem: vaginal bleeding  Subspeciality: General GYN     HPI: Keisha Parks is a 41 y o  P3 who presents with heavy vaginal bleeding  Reports bleeding for 56 days  Recently saw Dr Raduel Lozoya (on 9/9) and has [address] scheduled for 9/24  She is currently on Provera 10mg BID since seeing her OBGYN  She reports her bleeding has worsened throughout the day w/ heavy clots (the size of her hand) and she is going through a tampon every 20 mins  Her bleeding is now better since taking her Provera earlier this morning  She also reports dizziness at times and feeling winded more easily when going up the stairs  Also reporting LLQ pain + cramping  Reports having a "cyst on my left ovary"  She is currently a smoker, 1 pack a day  Review of Systems   Respiratory: Negative for shortness of breath  Cardiovascular: Negative for chest pain  Gastrointestinal: Positive for abdominal pain     Genitourinary: Positive for menstrual problem, pelvic pain and vaginal bleeding  Neurological: Positive for light-headedness  Historical Information   Past Medical History:   Diagnosis Date    Anemia      History reviewed  No pertinent surgical history  OB History   No obstetric history on file  History reviewed  No pertinent family history  Social History   Social History     Substance and Sexual Activity   Alcohol Use Never    Frequency: Never     Social History     Substance and Sexual Activity   Drug Use Yes    Types: Marijuana    Comment: Medical     Social History     Tobacco Use   Smoking Status Current Every Day Smoker    Packs/day: 1 00   Smokeless Tobacco Never Used       Meds/Allergies   No current facility-administered medications for this encounter  Allergies   Allergen Reactions    Zithromax [Azithromycin] Anaphylaxis    Bactrim [Sulfamethoxazole-Trimethoprim]     Quinolones        Objective      Vitals: Blood pressure 148/64, pulse 101, temperature 97 7 °F (36 5 °C), temperature source Temporal, resp  rate 18, weight 87 5 kg (192 lb 14 4 oz), SpO2 100 %  There is no height or weight on file to calculate BMI  No intake or output data in the 24 hours ending 09/11/20 1342    Invasive Devices     Peripheral Intravenous Line            Peripheral IV 09/11/20 Right Forearm less than 1 day                Physical Exam  Constitutional:       Appearance: She is obese  Cardiovascular:      Rate and Rhythm: Normal rate  Pulmonary:      Effort: Pulmonary effort is normal  No respiratory distress  Abdominal:      Comments: Mild distension, no tenderness to palpation  Genitourinary:     Comments: Scant vaginal bleeding on speculum exam  Skin:     General: Skin is warm and dry  Neurological:      Mental Status: She is alert and oriented to person, place, and time     Psychiatric:         Mood and Affect: Mood normal          Behavior: Behavior normal          Lab Results:   Recent Results (from the past 24 hour(s))   Urine Macroscopic, POC    Collection Time: 09/11/20 11:57 AM   Result Value Ref Range    Color, UA Yellow     Clarity, UA Clear     pH, UA 5 5 4 5 - 8 0    Leukocytes, UA Negative Negative    Nitrite, UA Negative Negative    Protein, UA Negative Negative mg/dl    Glucose, UA Negative Negative mg/dl    Ketones, UA Negative Negative mg/dl    Urobilinogen, UA 0 2 0 2, 1 0 E U /dl E U /dl    Bilirubin, UA Negative Negative    Blood, UA Large (A) Negative    Specific Gravity, UA 1 010 1 003 - 1 030   Urine Microscopic    Collection Time: 09/11/20 11:57 AM   Result Value Ref Range    RBC, UA 0-1 (A) None Seen, 0-5 /hpf    WBC, UA None Seen None Seen, 0-5, 5-55, 5-65 /hpf    Epithelial Cells Occasional None Seen, Occasional /hpf    Bacteria, UA None Seen None Seen, Occasional /hpf   POCT pregnancy, urine    Collection Time: 09/11/20 12:01 PM   Result Value Ref Range    EXT PREG TEST UR (Ref: Negative) NEG (-)     Control VALID    CBC and differential    Collection Time: 09/11/20 12:30 PM   Result Value Ref Range    WBC 5 84 4 31 - 10 16 Thousand/uL    RBC 3 50 (L) 3 81 - 5 12 Million/uL    Hemoglobin 8 9 (L) 11 5 - 15 4 g/dL    Hematocrit 30 1 (L) 34 8 - 46 1 %    MCV 86 82 - 98 fL    MCH 25 4 (L) 26 8 - 34 3 pg    MCHC 29 6 (L) 31 4 - 37 4 g/dL    RDW 23 1 (H) 11 6 - 15 1 %    MPV 10 3 8 9 - 12 7 fL    Platelets 788 414 - 372 Thousands/uL    nRBC 0 /100 WBCs    Neutrophils Relative 59 43 - 75 %    Immat GRANS % 0 0 - 2 %    Lymphocytes Relative 29 14 - 44 %    Monocytes Relative 9 4 - 12 %    Eosinophils Relative 2 0 - 6 %    Basophils Relative 1 0 - 1 %    Neutrophils Absolute 3 40 1 85 - 7 62 Thousands/µL    Immature Grans Absolute 0 01 0 00 - 0 20 Thousand/uL    Lymphocytes Absolute 1 69 0 60 - 4 47 Thousands/µL    Monocytes Absolute 0 55 0 17 - 1 22 Thousand/µL    Eosinophils Absolute 0 14 0 00 - 0 61 Thousand/µL    Basophils Absolute 0 05 0 00 - 0 10 Thousands/µL   Protime-INR    Collection Time: 09/11/20 12:30 PM   Result Value Ref Range    Protime 13 3 11 6 - 14 5 seconds    INR 1 03 0 84 - 1 19   APTT    Collection Time: 09/11/20 12:30 PM   Result Value Ref Range    PTT 28 23 - 37 seconds   Comprehensive metabolic panel    Collection Time: 09/11/20 12:30 PM   Result Value Ref Range    Sodium 140 136 - 145 mmol/L    Potassium 4 0 3 5 - 5 3 mmol/L    Chloride 108 100 - 108 mmol/L    CO2 21 21 - 32 mmol/L    ANION GAP 11 4 - 13 mmol/L    BUN 11 5 - 25 mg/dL    Creatinine 0 63 0 60 - 1 30 mg/dL    Glucose 93 65 - 140 mg/dL    Calcium 8 1 (L) 8 3 - 10 1 mg/dL    AST 8 5 - 45 U/L    ALT 19 12 - 78 U/L    Alkaline Phosphatase 62 46 - 116 U/L    Total Protein 7 1 6 4 - 8 2 g/dL    Albumin 3 4 (L) 3 5 - 5 0 g/dL    Total Bilirubin 0 13 (L) 0 20 - 1 00 mg/dL    eGFR 109 ml/min/1 73sq m   Lipase    Collection Time: 09/11/20 12:30 PM   Result Value Ref Range    Lipase 87 73 - 393 u/L         Chen Vinson MD  OB/GYN PGY-3  9/11/2020  1:42 PM

## 2020-09-11 NOTE — ED PROVIDER NOTES
Emergency Department Sign Out Note        Sign out and transfer of care from [facility]  See Separate Emergency Department note  The patient, Tammy Willson, was evaluated by the previous provider for vaginal bleeding  Patient has past medical history of anemia, presenting to the emergency department for vaginal bleeding for 56 days  Patient states bleeding has gradually worsened  Patient states she has been using a tampon every 20 minutes and passing clots the size of her hand  Patient also reporting left lower quadrant pain  Patient has been taking Advil with only mild relief in symptoms  Patient follows [facility]  Patient is scheduled to have hysterectomy in 2 weeks  Patient was seen at [address] [place] [address] on 09/08 for similar symptoms, evaluated by OBGYN and recommended that she received Depo shot and TXA  Patient declined and wanted to continue taking her oral regimen and follow-up as outpatient  Patient did have outpatient ultrasound which showed thickened endometrial stripe  Patient without fevers      Workup Completed:  Results Reviewed     Procedure Component Value Units Date/Time    Comprehensive metabolic panel [[id number]]  (Abnormal) Collected:  09/11/20 1230    Lab Status:  Final result Specimen:  Blood from Hand, Right Updated:  09/11/20 1255     Sodium 140 mmol/L      Potassium 4 0 mmol/L      Chloride 108 mmol/L      CO2 21 mmol/L      ANION GAP 11 mmol/L      BUN 11 mg/dL      Creatinine 0 63 mg/dL      Glucose 93 mg/dL      Calcium 8 1 mg/dL      AST 8 U/L      ALT 19 U/L      Alkaline Phosphatase 62 U/L      Total Protein 7 1 g/dL      Albumin 3 4 g/dL      Total Bilirubin 0 13 mg/dL      eGFR 109 ml/min/1 73sq m     Narrative:       [place] guidelines for Chronic Kidney Disease (CKD):     Stage 1 with normal or high GFR (GFR > 90 mL/min/1 73 square meters)    Stage 2 Mild CKD (GFR = 60-89 mL/min/1 73 square meters)    Stage 3A Moderate CKD (GFR = 45-59 mL/min/1 73 square meters)    Stage 3B Moderate CKD (GFR = 30-44 mL/min/1 73 square meters)    Stage 4 Severe CKD (GFR = 15-29 mL/min/1 73 square meters)    Stage 5 End Stage CKD (GFR <15 mL/min/1 73 square meters)  Note: GFR calculation is accurate only with a steady state creatinine    Lipase [[id number]]  (Normal) Collected:  09/11/20 1230    Lab Status:  Final result Specimen:  Blood from Hand, Right Updated:  09/11/20 1255     Lipase 87 u/L     Protime-INR [[id number]]  (Normal) Collected:  09/11/20 1230    Lab Status:  Final result Specimen:  Blood from Hand, Right Updated:  09/11/20 1249     Protime 13 3 seconds      INR 1 03    APTT [[id number]]  (Normal) Collected:  09/11/20 1230    Lab Status:  Final result Specimen:  Blood from Hand, Right Updated:  09/11/20 1249     PTT 28 seconds     CBC and differential [[id number]]  (Abnormal) Collected:  09/11/20 1230    Lab Status:  Final result Specimen:  Blood from Hand, Right Updated:  09/11/20 1238     WBC 5 84 Thousand/uL      RBC 3 50 Million/uL      Hemoglobin 8 9 g/dL      Hematocrit 30 1 %      MCV 86 fL      MCH 25 4 pg      MCHC 29 6 g/dL      RDW 23 1 %      MPV 10 3 fL      Platelets 219 Thousands/uL      nRBC 0 /100 WBCs      Neutrophils Relative 59 %      Immat GRANS % 0 %      Lymphocytes Relative 29 %      Monocytes Relative 9 %      Eosinophils Relative 2 %      Basophils Relative 1 %      Neutrophils Absolute 3 40 Thousands/µL      Immature Grans Absolute 0 01 Thousand/uL      Lymphocytes Absolute 1 69 Thousands/µL      Monocytes Absolute 0 55 Thousand/µL      Eosinophils Absolute 0 14 Thousand/µL      Basophils Absolute 0 05 Thousands/µL     Urine Microscopic [[id number]]  (Abnormal) Collected:  09/11/20 1157    Lab Status:  Final result Specimen:  Urine, Clean Catch Updated:  09/11/20 1229     RBC, UA 0-1 /hpf      WBC, UA None Seen /hpf      Epithelial Cells Occasional /hpf      Bacteria, UA None Seen /hpf     POCT urinalysis dipstick [[id number]]  (Abnormal) Resulted:  09/11/20 1201    Lab Status:  Final result Specimen:  Urine Updated:  09/11/20 1201    POCT pregnancy, urine [[id number]]  (Normal) Resulted:  09/11/20 1201    Lab Status:  Final result Updated:  09/11/20 1201     EXT PREG TEST UR (Ref: Negative) NEG (-)     Control VALID    Urine Macroscopic, POC [[id number]]  (Abnormal) Collected:  09/11/20 1157    Lab Status:  Final result Specimen:  Urine Updated:  09/11/20 1159     Color, UA Yellow     Clarity, UA Clear     pH, UA 5 5     Leukocytes, UA Negative     Nitrite, UA Negative     Protein, UA Negative mg/dl      Glucose, UA Negative mg/dl      Ketones, UA Negative mg/dl      Urobilinogen, UA 0 2 E U /dl      Bilirubin, UA Negative     Blood, UA Large     Specific [place], UA 1 010    Narrative:       CLINITEK RESULT            ED Course / Workup Pending (followup):    CT a/p shows: 1  No acute abnormality identified in the abdomen or pelvis  2   3 9 cm left adnexal simple appearing cyst which would be better characterized with ultrasound  According to current guidelines [employer] 2020; 24:290-771) in this premenopausal woman, this requires no followup  3   2 0 cm right adrenal slightly low-attenuation lesion most consistent with adenoma  4   Additional findings as noted      OBGYN consulted  Hemoglobin 8 9, down from 9 8 three days ago  1 unit of RBC infused  Premarin injection given by [facility] [place]                      ED Course as of Sep 11 1851   Fri Sep 11, 2020   1700 Sign out from [employer], will discuss with OBGYN regarding disposition after transfusion      [phone] Spoke with OBGYN Resident, re-evaluated patient after transfusion, patient okay to be discharged at this time, has appointment with OBGYN on Monday as outpatient          Procedures  MDM  Number of Diagnoses or Management Options  Adenoma: new and requires workup  Dysfunctional uterine bleeding: established and worsening  Left ovarian cyst: new and requires workup  Diagnosis management comments:  Patient has past medical history of anemia, presenting to the emergency department for vaginal bleeding for 56 days  Patient states bleeding has gradually worsened  Patient states she has been using a tampon every 20 minutes and passing clots the size of her hand  Patient also reporting left lower quadrant pain  Patient has been taking Advil with only mild relief in symptoms  Patient follows [facility]  Patient is scheduled to have hysterectomy in 2 weeks  Patient was seen at [address] [place] [address] on 09/08 for similar symptoms, evaluated by OBGYN and recommended that she received Depo shot and TXA  Patient declined and wanted to continue taking her oral regimen and follow-up as outpatient  Patient did have outpatient ultrasound which showed thickened endometrial stripe  Patient without fevers  OBGYN evaluated patient in emergency department, recommended observation, patient states she does not want to stay  OBGYN recommends 1 unit of blood transfused and observe in the emergency department  OBGYN re-evaluated patient in the emergency department, state patient is okay for discharge at this time, OBGYN resident okay with blood pressure at this time  Patient has appointment with OBGYN on Monday for re-evaluation  Strict return precautions discussed with patient  Patient verbalizes understanding and agrees with plan  The management plan was discussed in detail with the patient at bedside and all questions were answered  Prior to discharge, I provided both verbal and written instructions  I discussed with the patient the signs and symptoms for which to return to the emergency department  All questions were answered and patient was comfortable with the plan of care and discharged to home   The patient agrees to return to the Emergency Department for concerns and/or progression of illness  Disposition  Final diagnoses:   Dysfunctional uterine bleeding   Left ovarian cyst   Adenoma     Time reflects when diagnosis was documented in both MDM as applicable and the Disposition within this note     Time User Action Codes Description Comment    9/11/2020  4:58 PM Zarina Occitan A Add [N93 8] Dysfunctional uterine bleeding     9/11/2020  5:23 PM Zarina Occitan A Add [D89 [id number]] Left ovarian cyst     9/11/2020  5:23 PM Zarina Occitan A Add [D36 9] Adenoma       ED Disposition     ED Disposition Condition Date/Time Comment    Discharge Stable Fri Sep 11, 2020  5:56 PM Pawan Wray discharge to home/self care  Follow-up Information     Follow up With Specialties Details Why Contact Info Additional [address] [facility] Emergency Department Emergency Medicine Go to  If symptoms worsen [address] [facility] [id number]  [phone] [facility], [address] [place] [address], [id number]        Discharge Medication List as of 9/11/2020  5:56 PM      CONTINUE these medications which have NOT CHANGED    Details   ferrous sulfate 325 (65 Fe) mg tablet Take 325 mg by mouth daily with breakfast, Historical Med      progesterone 200 mg vaginal suppository Insert 20 mg into the vagina daily at bedtime, Historical Med           No discharge procedures on file         ED Provider  Electronically Signed by     Toma Blandon PA-C  09/11/20 8559

## 2020-09-11 NOTE — ED NOTES
Patient reports she feels itchy on her back and legs  No redness/hives present at this time  Vera SEAY notified        Manuelito Kaiser RN  09/11/20 0796

## 2020-09-11 NOTE — ED PROVIDER NOTES
History  Chief Complaint   Patient presents with    Vaginal Bleeding     Pt reports 56 days of vaginal bleeding  Is scheduled to have a hysterectomy  States that today she is going through a tampon in 20 minutes with "golf-ball sized clots"  Reports pain in left lower abd/pelvis that radiates to back  Pain is worse with standing      45y  o female with PMH of anemia presents to the ER for 56 days of vaginal bleeding  Patient states her bleeding has worsened  She is using a tampon every 20 minutes and she is passing clots the size of her hand  She also has LLQ pain that radiates into her back  She has been taking Advil for pain with some relief  Symptoms are constant  She follows with Dr Mickey Patel from [address] [place] [address]  She is scheduled to have a hysterectomy in 2 weeks  She denies fever, chills, URI symptoms, chest ,pain, dyspnea, N/V/D, urinary symptoms, weakness or paresthesias  Patient was seen at [facility] on 9/8 for symptoms  She was evaluated by OBGYN and recommended that she be given a Depo shot and TXA IV  Patient declined and wanted to continue taking her oral regimen and follow up  Patient reports a history of abnormal uterine bleeding since she started getting her periods but it has significantly worsened since her last child was born 3 years ago  She has been seeing her gynecologist for symptoms and was taking OCPs, TXA, and progesterone  She did have an outpatient US performed which showed a thickened endometrial stripe  History provided by:  Patient   used: No        Prior to Admission Medications   Prescriptions Last Dose Informant Patient Reported? Taking?   ferrous sulfate 325 (65 Fe) mg tablet   Yes Yes   Sig: Take 325 mg by mouth daily with breakfast   progesterone 200 mg vaginal suppository   Yes Yes   Sig: Insert 20 mg into the vagina daily at bedtime      Facility-Administered Medications: None       Past Medical History:   Diagnosis Date    Anemia        History reviewed   No pertinent surgical history  History reviewed  No pertinent family history  I have reviewed and agree with the history as documented  E-Cigarette/Vaping    E-Cigarette Use Never User      E-Cigarette/Vaping Substances    Nicotine No     THC No     CBD No     Flavoring No     Other No     Unknown No      Social History     Tobacco Use    Smoking status: Current Every Day Smoker     Packs/day: 1 00    Smokeless tobacco: Never Used   Substance Use Topics    Alcohol use: Never     Frequency: Never    Drug use: Yes     Types: Marijuana     Comment: Medical       Review of Systems   Constitutional: Negative for activity change, appetite change, chills and fever  HENT: Negative for congestion, drooling, ear discharge, ear pain, facial swelling, rhinorrhea and sore throat  Eyes: Negative for redness  Respiratory: Negative for cough and shortness of breath  Cardiovascular: Negative for chest pain  Gastrointestinal: Positive for abdominal pain  Negative for diarrhea, nausea and vomiting  Genitourinary: Positive for vaginal bleeding  Negative for dysuria, frequency, hematuria and urgency  Musculoskeletal: Positive for back pain  Negative for neck stiffness  Skin: Negative for rash  Allergic/Immunologic: Negative for food allergies  Neurological: Negative for weakness and numbness  Physical Exam  Physical Exam  Vitals signs and nursing note reviewed  Constitutional:       General: She is not in acute distress  Appearance: She is not toxic-appearing  HENT:      Head: Normocephalic and atraumatic  Eyes:      Conjunctiva/sclera: Conjunctivae normal    Neck:      Musculoskeletal: Normal range of motion and neck supple  Trachea: No tracheal deviation  Cardiovascular:      Rate and Rhythm: Normal rate and regular rhythm  Heart sounds: Normal heart sounds, S1 normal and S2 normal  No murmur  No friction rub  No gallop      Pulmonary:      Effort: Pulmonary effort is normal  No respiratory distress  Breath sounds: Normal breath sounds  No decreased breath sounds, wheezing, rhonchi or rales  Chest:      Chest wall: No tenderness  Abdominal:      General: Bowel sounds are normal  There is no distension  Palpations: Abdomen is soft  Tenderness: There is abdominal tenderness in the left lower quadrant  There is no guarding or rebound  Skin:     General: Skin is warm and dry  Findings: No rash  Neurological:      Mental Status: She is alert  GCS: GCS eye subscore is 4  GCS verbal subscore is 5  GCS motor subscore is 6           Vital Signs  ED Triage Vitals [09/11/20 1130]   Temperature Pulse Respirations Blood Pressure SpO2   97 7 °F (36 5 °C) 101 18 148/64 100 %      Temp Source Heart Rate Source Patient Position - Orthostatic VS BP Location FiO2 (%)   Temporal Monitor Sitting Right arm --      Pain Score       6           Vitals:    09/11/20 1645 09/11/20 1700 09/11/20 1730 09/11/20 1756   BP: 115/60 (!) 121/49 104/78 98/57   Pulse: 72 74 86 68   Patient Position - Orthostatic VS:             Visual Acuity      ED Medications  Medications   sodium chloride 0 9 % bolus 1,000 mL (0 mL Intravenous Stopped 9/11/20 1442)   HYDROmorphone (DILAUDID) injection 1 mg (1 mg Intravenous Given 9/11/20 1220)   iohexol (OMNIPAQUE) 350 MG/ML injection (SINGLE-DOSE) 100 mL (100 mL Intravenous Given 9/11/20 1311)   conjugated estrogens (PREMARIN) injection 25 mg (25 mg Intravenous Given 9/11/20 1449)   HYDROmorphone (DILAUDID) injection 1 mg (1 mg Intravenous Given 9/11/20 1517)   sodium chloride 0 9 % bolus 1,000 mL (0 mL Intravenous Stopped 9/11/20 1755)   diphenhydrAMINE (BENADRYL) injection 25 mg (25 mg Intravenous Given 9/11/20 1755)       Diagnostic Studies  Results Reviewed     Procedure Component Value Units Date/Time    Comprehensive metabolic panel [[id number]]  (Abnormal) Collected:  09/11/20 1230    Lab Status:  Final result Specimen:  Blood from Hand, Right Updated:  09/11/20 1255     Sodium 140 mmol/L      Potassium 4 0 mmol/L      Chloride 108 mmol/L      CO2 21 mmol/L      ANION GAP 11 mmol/L      BUN 11 mg/dL      Creatinine 0 63 mg/dL      Glucose 93 mg/dL      Calcium 8 1 mg/dL      AST 8 U/L      ALT 19 U/L      Alkaline Phosphatase 62 U/L      Total Protein 7 1 g/dL      Albumin 3 4 g/dL      Total Bilirubin 0 13 mg/dL      eGFR 109 ml/min/1 73sq m     Narrative:       [place] guidelines for Chronic Kidney Disease (CKD):     Stage 1 with normal or high GFR (GFR > 90 mL/min/1 73 square meters)    Stage 2 Mild CKD (GFR = 60-89 mL/min/1 73 square meters)    Stage 3A Moderate CKD (GFR = 45-59 mL/min/1 73 square meters)    Stage 3B Moderate CKD (GFR = 30-44 mL/min/1 73 square meters)    Stage 4 Severe CKD (GFR = 15-29 mL/min/1 73 square meters)    Stage 5 End Stage CKD (GFR <15 mL/min/1 73 square meters)  Note: GFR calculation is accurate only with a steady state creatinine    Lipase [[id number]]  (Normal) Collected:  09/11/20 1230    Lab Status:  Final result Specimen:  Blood from Hand, Right Updated:  09/11/20 1255     Lipase 87 u/L     Protime-INR [[id number]]  (Normal) Collected:  09/11/20 1230    Lab Status:  Final result Specimen:  Blood from Hand, Right Updated:  09/11/20 1249     Protime 13 3 seconds      INR 1 03    APTT [[id number]]  (Normal) Collected:  09/11/20 1230    Lab Status:  Final result Specimen:  Blood from Hand, Right Updated:  09/11/20 1249     PTT 28 seconds     CBC and differential [[id number]]  (Abnormal) Collected:  09/11/20 1230    Lab Status:  Final result Specimen:  Blood from Hand, Right Updated:  09/11/20 1238     WBC 5 84 Thousand/uL      RBC 3 50 Million/uL      Hemoglobin 8 9 g/dL      Hematocrit 30 1 %      MCV 86 fL      MCH 25 4 pg      MCHC 29 6 g/dL      RDW 23 1 %      MPV 10 3 fL      Platelets 764 Thousands/uL      nRBC 0 /100 WBCs      Neutrophils Relative 59 %      Immat GRANS % 0 % Lymphocytes Relative 29 %      Monocytes Relative 9 %      Eosinophils Relative 2 %      Basophils Relative 1 %      Neutrophils Absolute 3 40 Thousands/µL      Immature Grans Absolute 0 01 Thousand/uL      Lymphocytes Absolute 1 69 Thousands/µL      Monocytes Absolute 0 55 Thousand/µL      Eosinophils Absolute 0 14 Thousand/µL      Basophils Absolute 0 05 Thousands/µL     Urine Microscopic [[id number]]  (Abnormal) Collected:  09/11/20 1157    Lab Status:  Final result Specimen:  Urine, Clean Catch Updated:  09/11/20 1229     RBC, UA 0-1 /hpf      WBC, UA None Seen /hpf      Epithelial Cells Occasional /hpf      Bacteria, UA None Seen /hpf     POCT urinalysis dipstick [[id number]]  (Abnormal) Resulted:  09/11/20 1201    Lab Status:  Final result Specimen:  Urine Updated:  09/11/20 1201    POCT pregnancy, urine [[id number]]  (Normal) Resulted:  09/11/20 1201    Lab Status:  Final result Updated:  09/11/20 1201     EXT PREG TEST UR (Ref: Negative) NEG (-)     Control VALID    Urine Macroscopic, POC [[id number]]  (Abnormal) Collected:  09/11/20 1157    Lab Status:  Final result Specimen:  Urine Updated:  09/11/20 1159     Color, UA Yellow     Clarity, UA Clear     pH, UA 5 5     Leukocytes, UA Negative     Nitrite, UA Negative     Protein, UA Negative mg/dl      Glucose, UA Negative mg/dl      Ketones, UA Negative mg/dl      Urobilinogen, UA 0 2 E U /dl      Bilirubin, UA Negative     Blood, UA Large     Specific Rigby, UA 1 010    Narrative:       CLINITEK RESULT                 CT abdomen pelvis with contrast   Final Result by Marily Giraldo MD (09/11 1352)         1  No acute abnormality identified in the abdomen or pelvis  2   3 9 cm left adnexal simple appearing cyst which would be better characterized with ultrasound  According to current guidelines Tim [facility]ari Radiol 2020; 18:926-619) in this premenopausal woman, this requires no followup     3   2 0 cm right adrenal slightly low-attenuation lesion most consistent with adenoma  4   Additional findings as noted  Workstation performed: [id number]                    Procedures  Procedures         ED Course  ED Course as of Sep 12 0937   Fri Sep 11, 2020   1310 [place] text sent to [facility]       1321 OB coming to see patient  [address] with OB  They wanted to admit patient for observation but patient not agreeable  OB wants 1 unit of blood transfused and for us to observe patient in the ER  OB will let us know how long they want us to observe  OB will also place orders for Premarin  SBIRT 20yo+      Most Recent Value   SBIRT (24 yo +)   In order to provide better care to our patients, we are screening all of our patients for alcohol and drug use  Would it be okay to ask you these screening questions? Yes Filed at: 09/11/2020 1406   Initial Alcohol Screen: US AUDIT-C    1  How often do you have a drink containing alcohol?  0 Filed at: 09/11/2020 1406   2  How many drinks containing alcohol do you have on a typical day you are drinking? 0 Filed at: 09/11/2020 1406   3b  FEMALE Any Age, or MALE 65+: How often do you have 4 or more drinks on one occassion? 0 Filed at: 09/11/2020 1406   Audit-C Score  0 Filed at: 09/11/2020 1406   KENDALL: How many times in the past year have you    Used an illegal drug or used a prescription medication for non-medical reasons? Monthly Filed at: 09/11/2020 1406   DAST-10: In the past 12 months      1  Have you used drugs other than those required for medical reasons? 0 Filed at: 09/11/2020 1406   2  Do you use more than one drug at a time? 0 Filed at: 09/11/2020 1406   3  Have you had medical problems as a result of your drug use (e g , memory loss, hepatitis, convulsions, bleeding, etc )? 0 Filed at: 09/11/2020 1406   4  Have you had "blackouts" or "flashbacks" as a result of drug use? YesNo  0 Filed at: 09/11/2020 1406   5  Do you ever feel bad or guilty about your drug use?   0 Filed at: 09/11/2020 1406   6  Does your spouse (or parent) ever complain about your involvement with drugs? 0 Filed at: 09/11/2020 1406   7  Have you neglected your family because of your use of drugs? 0 Filed at: 09/11/2020 1406   8  Have you engaged in illegal activities in order to obtain drugs? 0 Filed at: 09/11/2020 1406   9  Have you ever experienced withdrawal symptoms (felt sick) when you stopped taking drugs? 0 Filed at: 09/11/2020 1406   10  Are you always able to stop using drugs when you want to?  0 Filed at: 09/11/2020 1406   DAST-10 Score  0 Filed at: 09/11/2020 1406                  MDM  Number of Diagnoses or Management Options  Dysfunctional uterine bleeding: new and requires workup  Diagnosis management comments: DDX consists of but not limited to: abnormal uterine bleeding, anemia, cyst, UTI    Will check labs and imaging  0 Tiger text sent to OB      1321 OB coming to see patient  [address] with OB, Yanira Eckert  OB wanted to admit patient for observation but patient not agreeable due to having younger children at home with no one to watch them  OB wants 1 unit of blood transfused and for us to observe patient in the ER  OB will let us know how long they want us to observe  OB will also place orders for Premarin  OB would like patient to be observed at least until 17:00  Informed OB of patient's CT findings of left ovarian cyst  They state this has been there and they do not think patient needs US  Informed OB of patient's blood pressure being on the lower side  Will give fluids  OB will come see patient after sign out  Patient signed out to [facility] awaiting to be seen by OB  Patient stable                 Amount and/or Complexity of Data Reviewed  Clinical lab tests: ordered and reviewed  Tests in the radiology section of CPT®: ordered and reviewed  Review and summarize past medical records: yes  Discuss the patient with other providers: yes    Patient Progress  Patient progress: stable      Disposition  Final diagnoses:   Dysfunctional uterine bleeding   Left ovarian cyst   Adenoma     Time reflects when diagnosis was documented in both MDM as applicable and the Disposition within this note     Time User Action Codes Description Comment    9/11/2020  4:58 PM Luz Dung A Add [N93 8] Dysfunctional uterine bleeding     9/11/2020  5:23 PM Luz Dung A Add [M94 169] Left ovarian cyst     9/11/2020  5:23 PM Luz Dung A Add [D36 9] Adenoma       ED Disposition     ED Disposition Condition Date/Time Comment    Discharge Stable Fri Sep 11, 2020  5:56 PM Grecia Escalante discharge to home/self care  Follow-up Information     Follow up With Specialties Details Why Contact Info Additional [address] [facility] Emergency Department Emergency Medicine Go to  If symptoms worsen Gina [phone] [place] ED, [address]  , [place], [place], [address]          Discharge Medication List as of 9/11/2020  5:56 PM      CONTINUE these medications which have NOT CHANGED    Details   ferrous sulfate 325 (65 Fe) mg tablet Take 325 mg by mouth daily with breakfast, Historical Med      progesterone 200 mg vaginal suppository Insert 20 mg into the vagina daily at bedtime, Historical Med           No discharge procedures on file      PDMP Review     None          ED Provider  Electronically Signed by           Yogi Laws PA-C  09/12/20 8397

## 2020-09-12 LAB
ABO GROUP BLD BPU: NORMAL
BPU ID: NORMAL
CROSSMATCH: NORMAL
UNIT DISPENSE STATUS: NORMAL
UNIT PRODUCT CODE: NORMAL
UNIT RH: NORMAL

## 2020-09-22 PROCEDURE — NC001 PR NO CHARGE: Performed by: OBSTETRICS & GYNECOLOGY

## 2020-09-23 ENCOUNTER — ANESTHESIA EVENT (OUTPATIENT)
Dept: PERIOP | Facility: HOSPITAL | Age: 45
End: 2020-09-23
Payer: COMMERCIAL

## 2020-09-23 RX ORDER — MEDROXYPROGESTERONE ACETATE 10 MG/1
10 TABLET ORAL
COMMUNITY
End: 2020-09-24 | Stop reason: HOSPADM

## 2020-09-23 RX ORDER — IBUPROFEN 200 MG
200-800 TABLET ORAL EVERY 6 HOURS PRN
COMMUNITY

## 2020-09-24 ENCOUNTER — HOSPITAL ENCOUNTER (OUTPATIENT)
Facility: HOSPITAL | Age: 45
Setting detail: OUTPATIENT SURGERY
Discharge: HOME | End: 2020-09-24
Attending: OBSTETRICS & GYNECOLOGY | Admitting: OBSTETRICS & GYNECOLOGY
Payer: COMMERCIAL

## 2020-09-24 ENCOUNTER — ANESTHESIA (OUTPATIENT)
Dept: PERIOP | Facility: HOSPITAL | Age: 45
End: 2020-09-24
Payer: COMMERCIAL

## 2020-09-24 VITALS
HEART RATE: 73 BPM | DIASTOLIC BLOOD PRESSURE: 60 MMHG | RESPIRATION RATE: 18 BRPM | OXYGEN SATURATION: 99 % | WEIGHT: 192 LBS | BODY MASS INDEX: 30.13 KG/M2 | SYSTOLIC BLOOD PRESSURE: 124 MMHG | TEMPERATURE: 97.5 F | HEIGHT: 67 IN

## 2020-09-24 VITALS — HEART RATE: 80 BPM

## 2020-09-24 DIAGNOSIS — D64.9 ANEMIA, UNSPECIFIED: ICD-10-CM

## 2020-09-24 DIAGNOSIS — N92.0 EXCESSIVE AND FREQUENT MENSTRUATION WITH REGULAR CYCLE: ICD-10-CM

## 2020-09-24 DIAGNOSIS — Z90.710 STATUS POST LAPAROSCOPIC HYSTERECTOMY: Primary | ICD-10-CM

## 2020-09-24 LAB
ABO GROUP BLD: NORMAL
BLD GP AB SCN SERPL QL: NEGATIVE
EST. AVERAGE GLUCOSE BLD GHB EST-MCNC: 94 MG/DL
EXT PREGNANCY TEST URINE: NEGATIVE
EXT. CONTROL: NORMAL
GLUCOSE SERPL-MCNC: 101 MG/DL (ref 65–140)
HBA1C MFR BLD: 4.9 %
RH BLD: POSITIVE
SPECIMEN EXPIRATION DATE: NORMAL

## 2020-09-24 PROCEDURE — 88307 TISSUE EXAM BY PATHOLOGIST: CPT | Performed by: PATHOLOGY

## 2020-09-24 PROCEDURE — 86900 BLOOD TYPING SEROLOGIC ABO: CPT | Performed by: OBSTETRICS & GYNECOLOGY

## 2020-09-24 PROCEDURE — 83036 HEMOGLOBIN GLYCOSYLATED A1C: CPT | Performed by: OBSTETRICS & GYNECOLOGY

## 2020-09-24 PROCEDURE — 81025 URINE PREGNANCY TEST: CPT | Performed by: ANESTHESIOLOGY

## 2020-09-24 PROCEDURE — 86850 RBC ANTIBODY SCREEN: CPT | Performed by: OBSTETRICS & GYNECOLOGY

## 2020-09-24 PROCEDURE — 86901 BLOOD TYPING SEROLOGIC RH(D): CPT | Performed by: OBSTETRICS & GYNECOLOGY

## 2020-09-24 PROCEDURE — NC001 PR NO CHARGE: Performed by: OBSTETRICS & GYNECOLOGY

## 2020-09-24 PROCEDURE — 82948 REAGENT STRIP/BLOOD GLUCOSE: CPT

## 2020-09-24 RX ORDER — ONDANSETRON 2 MG/ML
4 INJECTION INTRAMUSCULAR; INTRAVENOUS EVERY 6 HOURS PRN
Status: DISCONTINUED | OUTPATIENT
Start: 2020-09-24 | End: 2020-09-24 | Stop reason: HOSPADM

## 2020-09-24 RX ORDER — BUPIVACAINE HYDROCHLORIDE 5 MG/ML
INJECTION, SOLUTION PERINEURAL AS NEEDED
Status: DISCONTINUED | OUTPATIENT
Start: 2020-09-24 | End: 2020-09-24 | Stop reason: HOSPADM

## 2020-09-24 RX ORDER — OXYCODONE HYDROCHLORIDE 5 MG/1
5 TABLET ORAL EVERY 4 HOURS PRN
Qty: 10 TABLET | Refills: 0
Start: 2020-09-24 | End: 2020-09-24

## 2020-09-24 RX ORDER — NEOSTIGMINE METHYLSULFATE 1 MG/ML
INJECTION INTRAVENOUS AS NEEDED
Status: DISCONTINUED | OUTPATIENT
Start: 2020-09-24 | End: 2020-09-24

## 2020-09-24 RX ORDER — HYDROMORPHONE HCL/PF 1 MG/ML
SYRINGE (ML) INJECTION AS NEEDED
Status: DISCONTINUED | OUTPATIENT
Start: 2020-09-24 | End: 2020-09-24

## 2020-09-24 RX ORDER — OXYCODONE HYDROCHLORIDE 5 MG/1
5 TABLET ORAL EVERY 4 HOURS PRN
Status: DISCONTINUED | OUTPATIENT
Start: 2020-09-24 | End: 2020-09-24 | Stop reason: HOSPADM

## 2020-09-24 RX ORDER — DEXAMETHASONE SODIUM PHOSPHATE 4 MG/ML
INJECTION, SOLUTION INTRA-ARTICULAR; INTRALESIONAL; INTRAMUSCULAR; INTRAVENOUS; SOFT TISSUE AS NEEDED
Status: DISCONTINUED | OUTPATIENT
Start: 2020-09-24 | End: 2020-09-24

## 2020-09-24 RX ORDER — ACETAMINOPHEN 325 MG/1
650 TABLET ORAL EVERY 6 HOURS PRN
Qty: 30 TABLET | Refills: 0
Start: 2020-09-24

## 2020-09-24 RX ORDER — HYDROMORPHONE HCL/PF 1 MG/ML
0.5 SYRINGE (ML) INJECTION
Status: DISCONTINUED | OUTPATIENT
Start: 2020-09-24 | End: 2020-09-24 | Stop reason: HOSPADM

## 2020-09-24 RX ORDER — MAGNESIUM HYDROXIDE 1200 MG/15ML
LIQUID ORAL AS NEEDED
Status: DISCONTINUED | OUTPATIENT
Start: 2020-09-24 | End: 2020-09-24 | Stop reason: HOSPADM

## 2020-09-24 RX ORDER — ONDANSETRON 2 MG/ML
4 INJECTION INTRAMUSCULAR; INTRAVENOUS ONCE AS NEEDED
Status: COMPLETED | OUTPATIENT
Start: 2020-09-24 | End: 2020-09-24

## 2020-09-24 RX ORDER — GLYCOPYRROLATE 0.2 MG/ML
INJECTION INTRAMUSCULAR; INTRAVENOUS AS NEEDED
Status: DISCONTINUED | OUTPATIENT
Start: 2020-09-24 | End: 2020-09-24

## 2020-09-24 RX ORDER — IBUPROFEN 600 MG/1
600 TABLET ORAL EVERY 6 HOURS PRN
Status: DISCONTINUED | OUTPATIENT
Start: 2020-09-24 | End: 2020-09-24 | Stop reason: HOSPADM

## 2020-09-24 RX ORDER — DIPHENHYDRAMINE HYDROCHLORIDE 50 MG/ML
25 INJECTION INTRAMUSCULAR; INTRAVENOUS ONCE AS NEEDED
Status: COMPLETED | OUTPATIENT
Start: 2020-09-24 | End: 2020-09-24

## 2020-09-24 RX ORDER — ONDANSETRON 2 MG/ML
INJECTION INTRAMUSCULAR; INTRAVENOUS AS NEEDED
Status: DISCONTINUED | OUTPATIENT
Start: 2020-09-24 | End: 2020-09-24

## 2020-09-24 RX ORDER — ACETAMINOPHEN 325 MG/1
650 TABLET ORAL EVERY 6 HOURS PRN
Status: DISCONTINUED | OUTPATIENT
Start: 2020-09-24 | End: 2020-09-24 | Stop reason: HOSPADM

## 2020-09-24 RX ORDER — KETOROLAC TROMETHAMINE 30 MG/ML
30 INJECTION, SOLUTION INTRAMUSCULAR; INTRAVENOUS ONCE
Status: COMPLETED | OUTPATIENT
Start: 2020-09-24 | End: 2020-09-24

## 2020-09-24 RX ORDER — CEFAZOLIN SODIUM 2 G/50ML
2000 SOLUTION INTRAVENOUS ONCE
Status: COMPLETED | OUTPATIENT
Start: 2020-09-24 | End: 2020-09-24

## 2020-09-24 RX ORDER — FENTANYL CITRATE 50 UG/ML
INJECTION, SOLUTION INTRAMUSCULAR; INTRAVENOUS AS NEEDED
Status: DISCONTINUED | OUTPATIENT
Start: 2020-09-24 | End: 2020-09-24

## 2020-09-24 RX ORDER — DIPHENHYDRAMINE HYDROCHLORIDE 50 MG/ML
25 INJECTION INTRAMUSCULAR; INTRAVENOUS ONCE
Status: COMPLETED | OUTPATIENT
Start: 2020-09-24 | End: 2020-09-24

## 2020-09-24 RX ORDER — PROPOFOL 10 MG/ML
INJECTION, EMULSION INTRAVENOUS AS NEEDED
Status: DISCONTINUED | OUTPATIENT
Start: 2020-09-24 | End: 2020-09-24

## 2020-09-24 RX ORDER — ROCURONIUM BROMIDE 10 MG/ML
INJECTION, SOLUTION INTRAVENOUS AS NEEDED
Status: DISCONTINUED | OUTPATIENT
Start: 2020-09-24 | End: 2020-09-24

## 2020-09-24 RX ORDER — SODIUM CHLORIDE 9 MG/ML
125 INJECTION, SOLUTION INTRAVENOUS CONTINUOUS
Status: DISCONTINUED | OUTPATIENT
Start: 2020-09-24 | End: 2020-09-24 | Stop reason: HOSPADM

## 2020-09-24 RX ORDER — MIDAZOLAM HYDROCHLORIDE 2 MG/2ML
INJECTION, SOLUTION INTRAMUSCULAR; INTRAVENOUS AS NEEDED
Status: DISCONTINUED | OUTPATIENT
Start: 2020-09-24 | End: 2020-09-24

## 2020-09-24 RX ORDER — OXYCODONE HYDROCHLORIDE 5 MG/1
10 TABLET ORAL EVERY 4 HOURS PRN
Status: DISCONTINUED | OUTPATIENT
Start: 2020-09-24 | End: 2020-09-24 | Stop reason: HOSPADM

## 2020-09-24 RX ORDER — OXYCODONE HYDROCHLORIDE 5 MG/1
5 TABLET ORAL EVERY 4 HOURS PRN
Qty: 10 TABLET | Refills: 0 | Status: SHIPPED | OUTPATIENT
Start: 2020-09-24 | End: 2020-10-04

## 2020-09-24 RX ADMIN — HYDROMORPHONE HYDROCHLORIDE 0.5 MG: 1 INJECTION, SOLUTION INTRAMUSCULAR; INTRAVENOUS; SUBCUTANEOUS at 17:17

## 2020-09-24 RX ADMIN — DIPHENHYDRAMINE HYDROCHLORIDE 25 MG: 50 INJECTION, SOLUTION INTRAMUSCULAR; INTRAVENOUS at 17:33

## 2020-09-24 RX ADMIN — CEFAZOLIN SODIUM 2000 MG: 2 SOLUTION INTRAVENOUS at 14:54

## 2020-09-24 RX ADMIN — KETOROLAC TROMETHAMINE 30 MG: 30 INJECTION, SOLUTION INTRAMUSCULAR at 17:50

## 2020-09-24 RX ADMIN — HYDROMORPHONE HYDROCHLORIDE 0.5 MG: 1 INJECTION, SOLUTION INTRAMUSCULAR; INTRAVENOUS; SUBCUTANEOUS at 16:00

## 2020-09-24 RX ADMIN — ONDANSETRON 4 MG: 2 INJECTION INTRAMUSCULAR; INTRAVENOUS at 16:42

## 2020-09-24 RX ADMIN — GLYCOPYRROLATE 0.4 MG: 0.2 INJECTION, SOLUTION INTRAMUSCULAR; INTRAVENOUS at 16:41

## 2020-09-24 RX ADMIN — FENTANYL CITRATE 50 MCG: 50 INJECTION, SOLUTION INTRAMUSCULAR; INTRAVENOUS at 15:26

## 2020-09-24 RX ADMIN — HYDROMORPHONE HYDROCHLORIDE 0.5 MG: 1 INJECTION, SOLUTION INTRAMUSCULAR; INTRAVENOUS; SUBCUTANEOUS at 17:28

## 2020-09-24 RX ADMIN — FENTANYL CITRATE 50 MCG: 50 INJECTION, SOLUTION INTRAMUSCULAR; INTRAVENOUS at 15:09

## 2020-09-24 RX ADMIN — MIDAZOLAM 2 MG: 1 INJECTION INTRAMUSCULAR; INTRAVENOUS at 14:57

## 2020-09-24 RX ADMIN — DEXAMETHASONE SODIUM PHOSPHATE 4 MG: 4 INJECTION, SOLUTION INTRAMUSCULAR; INTRAVENOUS at 15:22

## 2020-09-24 RX ADMIN — PROPOFOL 200 MG: 10 INJECTION, EMULSION INTRAVENOUS at 15:09

## 2020-09-24 RX ADMIN — FENTANYL CITRATE 50 MCG: 50 INJECTION, SOLUTION INTRAMUSCULAR; INTRAVENOUS at 15:18

## 2020-09-24 RX ADMIN — LIDOCAINE HYDROCHLORIDE 100 MG: 20 INJECTION, SOLUTION INTRAVENOUS at 15:09

## 2020-09-24 RX ADMIN — ROCURONIUM BROMIDE 10 MG: 10 INJECTION, SOLUTION INTRAVENOUS at 15:56

## 2020-09-24 RX ADMIN — FENTANYL CITRATE 50 MCG: 50 INJECTION, SOLUTION INTRAMUSCULAR; INTRAVENOUS at 17:02

## 2020-09-24 RX ADMIN — NEOSTIGMINE METHYLSULFATE 3 MG: 1 INJECTION, SOLUTION INTRAVENOUS at 16:41

## 2020-09-24 RX ADMIN — SODIUM CHLORIDE: 0.9 INJECTION, SOLUTION INTRAVENOUS at 16:57

## 2020-09-24 RX ADMIN — DIPHENHYDRAMINE HYDROCHLORIDE 25 MG: 50 INJECTION, SOLUTION INTRAMUSCULAR; INTRAVENOUS at 19:27

## 2020-09-24 RX ADMIN — HYDROMORPHONE HYDROCHLORIDE 0.5 MG: 1 INJECTION, SOLUTION INTRAMUSCULAR; INTRAVENOUS; SUBCUTANEOUS at 16:59

## 2020-09-24 RX ADMIN — FENTANYL CITRATE 50 MCG: 50 INJECTION, SOLUTION INTRAMUSCULAR; INTRAVENOUS at 15:30

## 2020-09-24 RX ADMIN — SODIUM CHLORIDE 125 ML/HR: 0.9 INJECTION, SOLUTION INTRAVENOUS at 13:42

## 2020-09-24 RX ADMIN — ONDANSETRON 4 MG: 2 INJECTION INTRAMUSCULAR; INTRAVENOUS at 17:10

## 2020-09-24 RX ADMIN — ROCURONIUM BROMIDE 40 MG: 10 INJECTION, SOLUTION INTRAVENOUS at 15:09

## 2020-09-24 NOTE — ANESTHESIA PREPROCEDURE EVALUATION
Procedure:  LTH W/BILAT SALPINGECTOMY (N/A Abdomen)  CYSTO (N/A Bladder)    Relevant Problems   ANESTHESIA   (+) Delayed emergence from anesthesia      MUSCULOSKELETAL   (+) Fibromyalgia      NEURO/PSYCH   (+) Anxiety   (+) Chronic pain disorder   (+) Fibromyalgia        Physical Exam    Airway    Mallampati score: II  TM Distance: <3 FB  Neck ROM: full     Dental       Cardiovascular  Rhythm: regular, Rate: normal, Cardiovascular exam normal    Pulmonary  Pulmonary exam normal     Other Findings        Anesthesia Plan  ASA Score- 2     Anesthesia Type- general with ASA Monitors  Additional Monitors:   Airway Plan: ETT  Plan Factors-    Chart reviewed  Induction- intravenous      Postoperative Plan-     Informed Consent-

## 2021-07-22 ENCOUNTER — CONSULT (OUTPATIENT)
Dept: SURGERY | Facility: CLINIC | Age: 46
End: 2021-07-22

## 2021-07-22 VITALS
SYSTOLIC BLOOD PRESSURE: 132 MMHG | WEIGHT: 184.2 LBS | DIASTOLIC BLOOD PRESSURE: 80 MMHG | HEIGHT: 67 IN | TEMPERATURE: 98.3 F | BODY MASS INDEX: 28.91 KG/M2

## 2021-07-22 DIAGNOSIS — L02.31 ABSCESS OF LEFT BUTTOCK: Primary | ICD-10-CM

## 2021-07-22 PROCEDURE — 99202 OFFICE O/P NEW SF 15 MIN: CPT | Performed by: SURGERY

## 2021-07-22 PROCEDURE — 10060 I&D ABSCESS SIMPLE/SINGLE: CPT | Performed by: SURGERY

## 2021-07-22 RX ORDER — MELATONIN
5000 DAILY
COMMUNITY

## 2021-07-22 RX ORDER — VENLAFAXINE 37.5 MG/1
37.5 TABLET ORAL 2 TIMES DAILY
COMMUNITY

## 2021-07-22 RX ORDER — CEPHALEXIN 500 MG/1
100 CAPSULE ORAL 2 TIMES DAILY
COMMUNITY

## 2021-07-22 RX ORDER — CETIRIZINE HYDROCHLORIDE, PSEUDOEPHEDRINE HYDROCHLORIDE 5; 120 MG/1; MG/1
1 TABLET, FILM COATED, EXTENDED RELEASE ORAL DAILY
COMMUNITY

## 2021-07-22 NOTE — PATIENT INSTRUCTIONS
She should take a shower tomorrow morning and pull the packing out  She should take 2 showers a day and wash the area out for 30-50 seconds each time    She should return in 4-6 weeks so I can check that there is no residual abscess or wound

## 2021-07-22 NOTE — PROGRESS NOTES
Assessment/Plan: she had an old abscess cavity which is chronically inflamed  This was removed and the patient was given instructions for care  By the way, she might have some aspects of hidradenitis given the fact that she sweats a lot and has multiple skin areas which show old inflammation    No problem-specific Assessment & Plan notes found for this encounter  Diagnoses and all orders for this visit:    Abscess of left buttock  -     Incision and Drainage    Other orders  -     cephalexin (KEFLEX) 500 mg capsule; Take 100 mg by mouth 2 (two) times a day  -     cholecalciferol (VITAMIN D3) 1,000 units tablet; Take 5,000 Units by mouth daily  -     venlafaxine (EFFEXOR) 37 5 mg tablet; Take 37 5 mg by mouth 2 (two) times a day  -     cetirizine-pseudoephedrine (ZyrTEC-D) 5-120 MG per tablet; Take 1 tablet by mouth daily          Subjective:      Patient ID: Shanelle Lynne is a 40 y o  female  42-year-old white female who has a 7 month history of a occasionally draining sinus/ abscess on her left buttock cheek  There was no residual trauma except when she sat on a pencil at age 16 or so and needed to have the area excised so they could get out the pencil tip  For the last 7 months she has had intermittent drainage of basically clear fluid which then stop when she took antibiotics at that occurred  The following portions of the patient's history were reviewed and updated as appropriate: allergies, current medications, past family history, past medical history, past social history, past surgical history and problem list     Review of Systems    Positive for anxiety, chronic pain disorder, fibromyalgia    Objective:      /80 (BP Location: Left arm, Patient Position: Sitting, Cuff Size: Standard)   Temp 98 3 °F (36 8 °C) (Tympanic)   Ht 5' 7" (1 702 m)   Wt 83 6 kg (184 lb 3 2 oz)   BMI 28 85 kg/m²          Physical Exam  Vitals reviewed  Exam conducted with a chaperone present  Constitutional:       General: She is not in acute distress  Appearance: She is not ill-appearing  Comments: Slightly overweight   HENT:      Head: Normocephalic and atraumatic  Eyes:      General: No scleral icterus  Conjunctiva/sclera: Conjunctivae normal    Musculoskeletal:         General: Normal range of motion  Skin:     General: Skin is warm and dry  Comments:  She has a 2 cm thickened area in the left buttock as shown  Ultrasound of this showed what appeared to be a elliptical mass which may and may not have had a hypoechoic portion within it  Neurological:      Mental Status: She is alert and oriented to person, place, and time  Psychiatric:         Mood and Affect: Mood normal          Behavior: Behavior normal          Thought Content: Thought content normal          Judgment: Judgment normal        Incision and Drainage    Date/Time: 7/22/2021 2:04 PM  Performed by: Shaunna Chiang MD  Authorized by: Shaunna Chiang MD     Location:     Type:  Abscess    Location:  Trunk    Trunk location:  Back  Pre-procedure details:     Skin preparation:  Chloraprep  Anesthesia (see MAR for exact dosages): Anesthesia method:  Local infiltration    Local anesthetic:  Lidocaine 2% WITH epi  Procedure details:     Complexity:  Simple    Incision types:  Elliptical    Scalpel blade:  15    Approach:  Open    Incision depth:  Subcutaneous    Wound management:  Probed and deloculated    Drainage amount:  Scant    Wound treatment:  Packing placed    Packing materials:  1/4 in gauze  Comments: The patient was identified by me and placed in the prone position upon the operating room table  The area was marked and then prepped and draped in a normal surgical manner  2% neutralized lidocaine with epinephrine is infused  Elliptical skin incision is now made  This is carried down into the subcutaneous tissue where hard white scar is identified    This is cut out sharply down to normal fat   The wound is packed and a dressing applied

## 2024-02-21 PROBLEM — J18.9 CAP (COMMUNITY ACQUIRED PNEUMONIA): Status: RESOLVED | Noted: 2017-07-22 | Resolved: 2024-02-21

## 2024-02-21 PROBLEM — J01.00 ACUTE MAXILLARY SINUSITIS: Status: RESOLVED | Noted: 2017-07-25 | Resolved: 2024-02-21

## 2024-11-26 ENCOUNTER — HOSPITAL ENCOUNTER (OUTPATIENT)
Dept: NUCLEAR MEDICINE | Facility: HOSPITAL | Age: 49
Discharge: HOME | End: 2024-11-26

## 2024-11-26 DIAGNOSIS — R91.8 PULMONARY NODULES: ICD-10-CM

## 2024-11-26 LAB — GLUCOSE SERPL-MCNC: 95 MG/DL (ref 65–140)

## 2024-11-26 PROCEDURE — 78815 PET IMAGE W/CT SKULL-THIGH: CPT

## 2024-11-26 PROCEDURE — 82948 REAGENT STRIP/BLOOD GLUCOSE: CPT

## 2024-11-26 PROCEDURE — A9552 F18 FDG: HCPCS

## 2024-12-09 ENCOUNTER — TELEPHONE (OUTPATIENT)
Age: 49
End: 2024-12-09

## 2024-12-10 ENCOUNTER — TELEPHONE (OUTPATIENT)
Dept: PULMONOLOGY | Facility: CLINIC | Age: 49
End: 2024-12-10

## 2025-02-25 ENCOUNTER — OFFICE VISIT (OUTPATIENT)
Dept: INTERNAL MEDICINE CLINIC | Facility: CLINIC | Age: 50
End: 2025-02-25
Payer: COMMERCIAL

## 2025-02-25 VITALS
SYSTOLIC BLOOD PRESSURE: 110 MMHG | HEART RATE: 98 BPM | WEIGHT: 167 LBS | DIASTOLIC BLOOD PRESSURE: 60 MMHG | BODY MASS INDEX: 26.21 KG/M2 | TEMPERATURE: 96.8 F | HEIGHT: 67 IN | OXYGEN SATURATION: 98 %

## 2025-02-25 DIAGNOSIS — Z12.31 ENCOUNTER FOR SCREENING MAMMOGRAM FOR BREAST CANCER: ICD-10-CM

## 2025-02-25 DIAGNOSIS — R91.8 PULMONARY NODULES: ICD-10-CM

## 2025-02-25 DIAGNOSIS — R94.8 ABNORMAL POSITRON EMISSION TOMOGRAPHY (PET) SCAN: ICD-10-CM

## 2025-02-25 DIAGNOSIS — K21.9 GASTROESOPHAGEAL REFLUX DISEASE WITHOUT ESOPHAGITIS: ICD-10-CM

## 2025-02-25 DIAGNOSIS — Z13.220 SCREENING FOR LIPID DISORDERS: ICD-10-CM

## 2025-02-25 DIAGNOSIS — M25.50 GENERALIZED JOINT PAIN: Primary | ICD-10-CM

## 2025-02-25 DIAGNOSIS — R10.84 GENERALIZED ABDOMINAL PAIN: ICD-10-CM

## 2025-02-25 PROCEDURE — 99204 OFFICE O/P NEW MOD 45 MIN: CPT | Performed by: NURSE PRACTITIONER

## 2025-02-25 RX ORDER — METHOCARBAMOL 750 MG/1
TABLET, FILM COATED ORAL
COMMUNITY
Start: 2025-02-02

## 2025-02-25 RX ORDER — SIMETHICONE 80 MG
80 TABLET,CHEWABLE ORAL 2 TIMES DAILY
COMMUNITY

## 2025-02-25 RX ORDER — VITAMIN B COMPLEX
1 CAPSULE ORAL DAILY
COMMUNITY

## 2025-02-25 RX ORDER — CETIRIZINE HYDROCHLORIDE 5 MG/1
5 TABLET ORAL DAILY
COMMUNITY

## 2025-02-25 RX ORDER — TRAMADOL HYDROCHLORIDE 50 MG/1
TABLET ORAL
COMMUNITY
Start: 2025-02-02

## 2025-02-25 RX ORDER — GINSENG 100 MG
CAPSULE ORAL 2 TIMES DAILY
COMMUNITY

## 2025-02-25 RX ORDER — FAMOTIDINE 40 MG/5ML
40 POWDER, FOR SUSPENSION ORAL 2 TIMES DAILY
COMMUNITY

## 2025-02-25 NOTE — PROGRESS NOTES
Name: Ani Cruz      : 1975      MRN: [id number]  Encounter Provider: FANNY Sr  Encounter Date: 2025   Encounter department: [facility] INTERNAL MEDICINE  :  Assessment & Plan  Generalized joint pain  Failed multiple medications in the past including gabapentin, lyrica, cymbalta, and muscle relaxers.  Check labs.   Consider rheumatology evaluation pending labs.   Orders:    Comprehensive metabolic panel; Future    CBC and differential; Future    TSH, 3rd generation with Free T4 reflex; Future    RF Screen w/ Reflex to Titer; Future    JADEN Screen w/Reflex Cascade; Future    Sedimentation rate, automated; Future    C-reactive protein; Future    Lyme Total AB W Reflex to IGM/IGG; Future    Abnormal positron emission tomography (PET) scan  Per patient, PET scan done in November showed pulmonary nodules, a breast mass, and abdominal mass.   Will obtain records to review.   Refer to pulmonary and GI.   Schedule a mammogram.       Pulmonary nodules  Will obtain recent records of PET scan and CT chest.     Orders:    Ambulatory Referral to Pulmonology; Future    Gastroesophageal reflux disease without esophagitis  On famotidine twice daily.     Orders:    Ambulatory Referral to Gastroenterology; Future    Generalized abdominal pain  See above.   Orders:    Ambulatory Referral to Gastroenterology; Future    UA w Reflex to Microscopic w Reflex to Culture; Future    Encounter for screening mammogram for breast cancer    Orders:    Mammo screening bilateral w 3d and cad; Future    Screening for lipid disorders    Orders:    Lipid Panel with Direct LDL reflex; Future           History of Present Illness   Alda is here today to establish care.   Last saw PCP a couple weeks ago.     She was sick back in the fall with an upper respiratory infection. Her cough lingered for months.   Her PCP ordered a chest xray which showed a lung nodule. She then had a CT chest which showed  multiple pulmonary nodules.   She had a PET scan in November. Per patient it showed multiple masses in her lungs, left breast and abdomen.   She was advised to get a mammogram and see pulmonary which she hasn't done yet because she wanted to switch to another provider.     She smokes 15 cigarettes a day.     She is frustrated with her health.   She has struggled with fibromyalgia and generalized joint pain for 19 years. It has been getting worse. It takes her 2 hours in the morning to be able to get out of bed.   Saw rheumatology in the past. Was on high doses of gabapentin, lyrica, mobic, ibuprofen, cymbalta, cevela- had side effects. Steroids help. Tramadol helps at times.     She has abdominal bloating and diffuse pain at times. She has constipation. She can't move her bowels without laxatives.   She has lost 15 lbs when her symptoms were worse. Symptoms are somewhat improved- she has gained some weight back.  She has never had a colonoscopy.   She denies any family history of colon cancer.           Shortness of Breath  The current episode started more than 1 month ago. The problem occurs daily. The problem has been waxing and waning since onset. Associated symptoms include orthopnea. Pertinent negatives include no chest pain, coughing, dizziness, fatigue, leg swelling, palpitations or wheezing. The symptoms are aggravated by emotional upset, exercise, URIs and eating.     Review of Systems   Constitutional:  Negative for activity change, appetite change, fatigue, fever and unexpected weight change.   Eyes:  Negative for visual disturbance.   Respiratory:  Positive for shortness of breath. Negative for cough, chest tightness and wheezing.    Cardiovascular:  Positive for orthopnea. Negative for chest pain, palpitations and leg swelling.   Gastrointestinal:  Positive for abdominal pain and constipation. Negative for blood in stool, diarrhea and vomiting.   Genitourinary:  Negative for difficulty urinating.  "  Musculoskeletal:  Positive for arthralgias and myalgias. Negative for neck pain.   Skin:  Negative for rash.   Neurological:  Negative for dizziness, weakness, light-headedness and headaches.   Psychiatric/Behavioral:  Negative for sleep disturbance.        Objective   /60   Pulse 98   Temp (!) 96.8 °F (36 °C)   Ht 5' 7\" (1.702 m)   Wt 75.8 kg (167 lb)   SpO2 98%   BMI 26.16 kg/m²      Physical Exam  Vitals reviewed.   Constitutional:       Appearance: Normal appearance. She is well-developed.   HENT:      Head: Normocephalic and atraumatic.   Eyes:      Conjunctiva/sclera: Conjunctivae normal.   Cardiovascular:      Rate and Rhythm: Normal rate and regular rhythm.      Heart sounds: Normal heart sounds.   Pulmonary:      Effort: Pulmonary effort is normal.      Breath sounds: Normal breath sounds.   Abdominal:      General: Bowel sounds are normal.      Palpations: Abdomen is soft.   Musculoskeletal:         General: Normal range of motion.      Right lower leg: No edema.      Left lower leg: No edema.   Skin:     General: Skin is warm and dry.   Neurological:      Mental Status: She is alert and oriented to person, place, and time.   Psychiatric:         Mood and Affect: Mood normal.         Behavior: Behavior normal.         "

## 2025-02-25 NOTE — PROGRESS NOTES
"Adult Annual Physical  Name: Ani Cruz      : 1975      MRN: [id number]  Encounter Provider: FANNY Sr  Encounter Date: 2025   Encounter department: [facility] INTERNAL MEDICINE    Assessment & Plan  Annual physical exam         Encounter for screening mammogram for breast cancer         Screening for colon cancer         Gastroesophageal reflux disease without esophagitis         Immunizations and preventive care screenings were discussed with patient today. Appropriate education was printed on patient's after visit summary.    Counseling:  {Annual Physical; Counselin}         History of Present Illness   {?Quick Links Encounters * My Last Note * Last Note in Specialty * Snapshot * Since Last Visit * History :83661}  Adult Annual Physical  Review of Systems  {Select to Display PMH (Optional):93077}    Objective {?Quick Links Trend Vitals * Enter New Vitals * Results Review * Timeline (Adult) * Labs * Imaging * Cardiology * Procedures * Lung Cancer Screening * Surgical eConsent :93977}  /60   Pulse 98   Temp (!) 96.8 °F (36 °C)   Ht 5' 7\" (1.702 m)   Wt 75.8 kg (167 lb)   SpO2 98%   BMI 26.16 kg/m²     Physical Exam  {Administrative / Billing Section (Optional):58313}  Answers submitted by the patient for this visit:  Shortness of Breath Questionnaire (Submitted on 2025)  Chief Complaint: Shortness of breath  Chronicity: chronic  Onset: more than 1 month ago  Frequency: daily  Progression since onset: waxing and waning  Episode duration: 15 Minutes  hemoptysis: No  sputum production: Yes  PND: Yes  syncope: No  claudication: Yes  leg pain: Yes  orthopnea: Yes  coryza: Yes  swollen glands: Yes  Aggravating factors: emotional upset, exercise, URIs, eating    "

## 2025-03-03 DIAGNOSIS — Z00.6 ENCOUNTER FOR EXAMINATION FOR NORMAL COMPARISON OR CONTROL IN CLINICAL RESEARCH PROGRAM: ICD-10-CM

## 2025-03-14 ENCOUNTER — CONSULT (OUTPATIENT)
Dept: PULMONOLOGY | Facility: CLINIC | Age: 50
End: 2025-03-14
Payer: COMMERCIAL

## 2025-03-14 VITALS
TEMPERATURE: 97.5 F | WEIGHT: 172 LBS | OXYGEN SATURATION: 98 % | HEART RATE: 83 BPM | BODY MASS INDEX: 26.94 KG/M2 | SYSTOLIC BLOOD PRESSURE: 146 MMHG | DIASTOLIC BLOOD PRESSURE: 88 MMHG

## 2025-03-14 DIAGNOSIS — J41.0 SIMPLE CHRONIC BRONCHITIS (HCC): ICD-10-CM

## 2025-03-14 DIAGNOSIS — F17.210 CIGARETTE NICOTINE DEPENDENCE WITHOUT COMPLICATION: ICD-10-CM

## 2025-03-14 DIAGNOSIS — R91.8 LUNG MASS: Primary | ICD-10-CM

## 2025-03-14 DIAGNOSIS — M79.7 FIBROMYALGIA: ICD-10-CM

## 2025-03-14 PROCEDURE — 99204 OFFICE O/P NEW MOD 45 MIN: CPT | Performed by: STUDENT IN AN ORGANIZED HEALTH CARE EDUCATION/TRAINING PROGRAM

## 2025-03-14 PROCEDURE — 99407 BEHAV CHNG SMOKING > 10 MIN: CPT | Performed by: STUDENT IN AN ORGANIZED HEALTH CARE EDUCATION/TRAINING PROGRAM

## 2025-03-14 RX ORDER — FLUTICASONE FUROATE, UMECLIDINIUM BROMIDE AND VILANTEROL TRIFENATATE 100; 62.5; 25 UG/1; UG/1; UG/1
1 POWDER RESPIRATORY (INHALATION) DAILY
Qty: 180 BLISTER | Refills: 0 | Status: SHIPPED | OUTPATIENT
Start: 2025-03-14 | End: 2025-06-12

## 2025-03-14 RX ORDER — VARENICLINE TARTRATE 0.5 MG/1
TABLET, FILM COATED ORAL
Qty: 95 TABLET | Refills: 0 | Status: SHIPPED | OUTPATIENT
Start: 2025-03-14 | End: 2025-04-10

## 2025-03-14 NOTE — PROGRESS NOTES
Pulmonary Outpatient Consultation Note   Cara Hunt 49 y.o. female MRN: [id number]  3/14/2025    Referring provider: No referring provider defined for this encounter.     Reason for Consultation: Abnormal PET scan    Assessment:    Abnormal PET scan with low PET avidity mass in the left lower lung, this PET scan was performed due to abnormal x-ray.  At the time she fell like she had no case of pneumonia.  He is an active smoker.  I reviewed the PET scan with her given the low level of activity this is likely inflammatory.  This PET scan is from November I advised her she should have a CT 3 months afterwards which she is due now.  She will self schedule the CT.  Based on that I will decide if she needs biopsy  Fibromyalgia has been battling for many years on as needed Tylenol and Motrin.  This seems to affect her quality of life due to severe pain.  I will refer her to rheumatology for evaluation  Nicotine dependence smoking 1 pack/day has been smoking since the age of 16 spoke about lung cancer development, emphysema development and airway disease.  She is aware of this.  She is looking for help with quitting smoking.  She tried Wellbutrin in the past.  For now we will start with Chantix and I will refer her to our nicotine cessation program  Dyspnea and an active smoker, concern for developing of airway disease versus emphysema/COPD.  Uses rescue inhaler which gives her mild relief.    Plan:    Refer to rheumatology for fibromyalgia workup  Start Trelegy 1 puff.  If too expensive let us know and we can try a different combination of triple therapy such as Breztri or try ICS/LABA  Use rescue inhaler as needed  Start Chantix  Refer to nicotine cessation program  Obtain CT chest now and we will decide if biopsy is needed.  No EBUS indicated based on the PET scan.  Await further imaging  Tobacco abuse: Discussed risks of ongoing cigarette smoking. Discussed the benefits of quitting immediately and prior to any  surgery. Discussed options including support, quit date, medications, and family support. Patient voiced understanding and knowledge. Greater than 10 minutes were needed for discussion of tobacco dependence and quitting counselling.  I have spent a total time of 46 minutes in caring for this patient on the day of the visit/encounter including Diagnostic results, Prognosis, Risks and benefits of tx options, Instructions for management, Patient and family education, Importance of tx compliance, Risk factor reductions, Impressions, Counseling / Coordination of care, Documenting in the medical record, Reviewing/placing orders in the medical record (including tests, medications, and/or procedures), and Obtaining or reviewing history  .        History of Present Illness   HPI:    49-year-old female here for evaluation of abnormal PET scan.  Has a past medical history of fibromyalgia, uterine hemorrhage/bleed, nicotine dependence since age of 16.  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    Patient states due to her history of fibromyalgia she had not abnormal chest x-ray that showed nodules this was followed up by a CT that was abnormal followed by a PET scan that was abnormal that showed low-level avidity.  With these findings she was referred to pulmonary for further evaluation.  She was ordered a CT chest but she has not obtained it and was looking for pulmonary advice on what to do next.    She currently smokes 1 pack/day has been smoking since the age of 16, family history includes mother with COPD.  No other history of lung cancer.  She works as a nurse for hospice agency locally.    He does have dyspnea, occasional episodes of bronchitis, has fibromyalgia and only takes Tylenol Motrin does not follow-up with rheumatology.  She has trouble getting out of bed she feels pain in her feet she has difficulty standing all day.  Diffuse aches and pains.  We discussed possible rheumatology referral for further  evaluation and she is interested in that.    Review of Systems   Constitutional:  Positive for fatigue.   HENT: Negative.     Eyes: Negative.    Respiratory: Negative.     Cardiovascular: Negative.    Gastrointestinal: Negative.    Endocrine: Negative.    Genitourinary: Negative.    Musculoskeletal:  Positive for arthralgias, joint swelling and myalgias. Negative for neck stiffness.   Skin: Negative.    Allergic/Immunologic: Positive for immunocompromised state.   Neurological: Negative.    Hematological: Negative.    Psychiatric/Behavioral: Negative.           Historical Information   Past Medical History:   Diagnosis Date   • Abdominal bloating    • Anemia    • Anesthesia complication     difficulty awakening   • Anxiety    • Arthritis     spine   • Chronic pain disorder     back   • Degenerative disc disease, thoracic    • Excessive and frequent menstruation with regular cycle    • Fibromyalgia    • Hearing decreased     left ear   • History of transfusion     itching after transfusion- had 2wks ago   • Nephrotic syndrome     in childhood ages 1.5- 8 years old   • Osteoarthritis    • Renal disorder     acute nephritis   • Seasonal allergies    • Spondylisthesis      Past Surgical History:   Procedure Laterality Date   • NASAL SEPTUM SURGERY     • NO PAST SURGERIES     • OR CYSTOURETHROSCOPY N/A 9/24/2020    Procedure: CYSTO;  Surgeon: Virginia Rivas DO;  Location: [facility];  Service: Gynecology   • OR LAPS TOTAL HYSTERECT 250 GM/< W/RMVL TUBE/OVARY N/A 9/24/2020    Procedure: LTH W/BILAT SALPINGECTOMY;  Surgeon: Virginia Rivas DO;  Location: [facility];  Service: Gynecology   • WISDOM TOOTH EXTRACTION       Family History   Problem Relation Age of Onset   • Melanoma Mother    • Tremor Mother    • Asthma Mother    • Charcot-Cindy-Tooth disease Father    • Heart failure Maternal Grandmother    • Hypertension Maternal Grandmother    • Cancer Maternal Grandfather    • Glaucoma Paternal Grandfather        Occupational  History: RN hospice     Social History     Tobacco Use   Smoking Status Every Day   • Current packs/day: 1.00   • Average packs/day: 1 pack/day for 30.0 years (30.0 ttl pk-yrs)   • Types: Cigarettes   Smokeless Tobacco Never   Tobacco Comments    pt quit last week       Meds/Allergies     Current Outpatient Medications:   •  acetaminophen (TYLENOL) 325 mg tablet, Take 2 tablets (650 mg total) by mouth every 6 (six) hours as needed for mild pain, Disp: 30 tablet, Rfl: 0  •  b complex vitamins capsule, Take 1 capsule by mouth daily, Disp: , Rfl:   •  cetirizine (ZyrTEC) 5 MG tablet, Take 5 mg by mouth daily, Disp: , Rfl:   •  Cholecalciferol (VITAMIN D3) 1,000 units tablet, Take 5,000 Units by mouth daily, Disp: , Rfl:   •  Cranberry 200 MG CAPS, Take by mouth 2 (two) times a day, Disp: , Rfl:   •  famotidine (PEPCID) 20 mg/2.5 mL oral suspension, Take 40 mg by mouth 2 (two) times a day, Disp: , Rfl:   •  fluticasone-umeclidinium-vilanterol (Trelegy Ellipta) 100-62.5-25 mcg/actuation inhaler, Inhale 1 puff daily Rinse mouth after use., Disp: 180 blister, Rfl: 0  •  ibuprofen (MOTRIN) 600 mg tablet, Take 1 tablet by mouth every 6 (six) hours as needed for mild pain or moderate pain, Disp: 30 tablet, Rfl: 0  •  methocarbamol (ROBAXIN) 750 mg tablet, TAKE 2 TABLET BY MOUTH EVERY EIGHT HOURS AS NEEDED FOR PAIN FOR MUSCLE SPASM., Disp: , Rfl:   •  simethicone (MYLICON) 80 mg chewable tablet, Chew 80 mg 2 (two) times a day, Disp: , Rfl:   •  traMADol (ULTRAM) 50 mg tablet, TAKE 1 TABLET BY MOUTH EVERY EIGHT HOURS AS NEEDED FOR PAIN FOR SEVERE PAIN., Disp: , Rfl:   •  varenicline (CHANTIX) 0.5 mg tablet, Take 1 tablet (0.5 mg total) by mouth daily for 3 days, THEN 1 tablet (0.5 mg total) 2 (two) times a day for 4 days, THEN 2 tablets (1 mg total) 2 (two) times a day for 21 days., Disp: 95 tablet, Rfl: 0  •  cephalexin (KEFLEX) 500 mg capsule, Take 100 mg by mouth 2 (two) times a day (Patient not taking: Reported on  3/14/2025), Disp: , Rfl:   •  cetirizine-pseudoephedrine (ZyrTEC-D) 5-120 MG per tablet, Take 1 tablet by mouth daily (Patient not taking: Reported on 3/14/2025), Disp: , Rfl:   •  cholecalciferol (VITAMIN D3) 1,000 units tablet, Take 5,000 Units by mouth daily (Patient not taking: Reported on 3/14/2025), Disp: , Rfl:   •  ferrous sulfate 325 (65 Fe) mg tablet, Take 325 mg by mouth daily with breakfast (Patient not taking: Reported on 7/22/2021), Disp: , Rfl:   •  ibuprofen (MOTRIN) 200 mg tablet, Take 200-800 mg by mouth every 6 (six) hours as needed for mild pain (Patient not taking: Reported on 7/22/2021), Disp: , Rfl:   •  magnesium gluconate (MAGONATE) 500 mg tablet, Take 500 mg by mouth daily at bedtime (Patient not taking: Reported on 3/14/2025), Disp: , Rfl:   •  Naproxen Sodium (ALEVE PO), Take by mouth 2 (two) times a day as needed (Patient not taking: Reported on 7/22/2021), Disp: , Rfl:   •  pseudoephedrine (SUDAFED) 120 MG 12 hr tablet, Take 120 mg by mouth every 12 (twelve) hours (Patient not taking: Reported on 3/14/2025), Disp: , Rfl:   •  Tranexamic Acid 650 MG TABS, Take by mouth as needed (Patient not taking: Reported on 3/14/2025), Disp: , Rfl:   •  venlafaxine (EFFEXOR) 37.5 mg tablet, Take 37.5 mg by mouth 2 (two) times a day (Patient not taking: Reported on 3/14/2025), Disp: , Rfl:   Allergies   Allergen Reactions   • Azithromycin Anaphylaxis   • Azithromycin Anaphylaxis   • Bactrim [Sulfamethoxazole-Trimethoprim] Rash   • Ipratropium Shortness Of Breath and Other (See Comments)     BronchoSpasm   • Other Other (See Comments)     Had severe bronchospasm with steroid inhalant - not sure of name.. pt thinks (possibly atrovent.)   • Zithromax [Azithromycin] Anaphylaxis   • Bactrim [Sulfamethoxazole-Trimethoprim]    • Quinolones    • Meloxicam Edema   • Meloxicam Swelling   • Quinolones Hallucinations and Myalgia       Vitals: Blood pressure 146/88, pulse 83, temperature 97.5 °F (36.4 °C), weight  "78 kg (172 lb), SpO2 98%, not currently breastfeeding., Body mass index is 26.94 kg/m². Oxygen Therapy  SpO2: 98 %    Physical Exam:    GEN: alert and oriented x 3, pleasant and cooperative   HEENT:  Normocephalic, atraumatic  NECK: No JVD   HEART: Rate, normal S1 and S2  LUNGS: Clear to auscultation bilaterally; no wheezes, rales, or rhonchi; respiration nonlabored   ABDOMEN:  Normoactive bowel sounds, soft, no tenderness, no distention  EXTREMITIES: no edema  NEURO: no gross focal findings  SKIN:  Dry, intact, warm to touch    Labs: I have personally reviewed pertinent lab results.  No results found for: \"IGE\"    Imaging and other studies: Results Review Statement: I personally reviewed the following image studies in PACS and associated radiology reports: CT chest. My interpretation of the radiology images/reports is: LLL mass .  PET scan and CT with left lower lobe mass, no mediastinal lymphadenopathy, right lower lobe paraspinal nodule    Pulmonary function testing:  NA    Other Studies: Results Review Statement: No pertinent imaging studies reviewed.    Tana Sol MD  Pulmonary and Critical Care Medicine     "

## 2025-03-14 NOTE — PATIENT INSTRUCTIONS
It was a pleasure seeing you today!    Schedule the CT chest  Start Trelegy 1 puff daily and rinse your mouth out afterwards  Use your rescue inhaler as needed  Prescribe Chantix  Refer to nicotine cessation program    Tana Sol MD  Pulmonary and Critical Care Medicine

## 2025-03-18 RX ORDER — BUPROPION HYDROCHLORIDE 300 MG/1
300 TABLET ORAL
COMMUNITY

## 2025-03-19 ENCOUNTER — CONSULT (OUTPATIENT)
Dept: GASTROENTEROLOGY | Facility: CLINIC | Age: 50
End: 2025-03-19
Payer: COMMERCIAL

## 2025-03-19 VITALS
DIASTOLIC BLOOD PRESSURE: 82 MMHG | TEMPERATURE: 98.3 F | SYSTOLIC BLOOD PRESSURE: 124 MMHG | BODY MASS INDEX: 26.37 KG/M2 | HEIGHT: 67 IN | WEIGHT: 168 LBS

## 2025-03-19 DIAGNOSIS — R19.03 RIGHT LOWER QUADRANT ABDOMINAL MASS: ICD-10-CM

## 2025-03-19 DIAGNOSIS — K21.9 GASTROESOPHAGEAL REFLUX DISEASE WITHOUT ESOPHAGITIS: Primary | ICD-10-CM

## 2025-03-19 DIAGNOSIS — R10.84 GENERALIZED ABDOMINAL PAIN: ICD-10-CM

## 2025-03-19 DIAGNOSIS — R19.4 ALTERED BOWEL HABITS: ICD-10-CM

## 2025-03-19 PROCEDURE — 99204 OFFICE O/P NEW MOD 45 MIN: CPT | Performed by: INTERNAL MEDICINE

## 2025-03-19 RX ORDER — POLYETHYLENE GLYCOL 3350, SODIUM SULFATE ANHYDROUS, SODIUM BICARBONATE, SODIUM CHLORIDE, POTASSIUM CHLORIDE 236; 22.74; 6.74; 5.86; 2.97 G/4L; G/4L; G/4L; G/4L; G/4L
4000 POWDER, FOR SOLUTION ORAL ONCE
Qty: 4000 ML | Refills: 0 | Status: SHIPPED | OUTPATIENT
Start: 2025-03-19 | End: 2025-03-19

## 2025-03-19 RX ORDER — BISACODYL 5 MG/1
10 TABLET, DELAYED RELEASE ORAL 2 TIMES DAILY
Qty: 4 TABLET | Refills: 0 | Status: SHIPPED | OUTPATIENT
Start: 2025-03-19 | End: 2025-03-20

## 2025-03-19 NOTE — PROGRESS NOTES
"Name: Cara Hunt      : 1975      MRN: [id number]  Encounter Provider: Caridad Concepcion MD  Encounter Date: 3/19/2025   Encounter department: [facility] [place]  :  Assessment & Plan  Gastroesophageal reflux disease without esophagitis  Patient with persistent ongoing symptoms of bloating, chronic nausea, acid reflux and intermittent sharp right-sided abdominal pain. No improvement with  BID famotidine and gas x    Plan  - Plan for EGD with celiac and H pylori biopsy  - Pending EGD results, trial PPI afterwards         Generalized abdominal pain  She also reports ongoing symptoms of bloating, chronic nausea, acid reflux and intermittent sharp right-sided abdominal pain that is worse when she has to have bowel bowel movements and relieved after she has bowel movement..  She she lost 15 pounds when her symptoms were pretty bad between.  Her weight is now stable.     Plan  - Plan for EGD/Colon.   - Golytely and dulcolax Bowel prep. 2 day prep   - CT abdomen and pelvis pending. Encouraged her to schedule it          Right lower quadrant abdominal mass   PET scan showed multiple masses in her lungs, left breast and abdomen.  Abdominal portion of the report showed \" Focal area of FDG avid soft tissue uptake in the right lower quadrant abdominal wall with associated soft tissue nodule and SUV max of 8.9.  This is adjacent to an area of superficial concave defect.  This is new from abdominal CT 2020\".    No overlying or palpale skin lesion on Exam    Plan  - CT abdomen and pelvis pending           History of Present Illness   Cara Hunt is a 49 y.o. female with a history of fibromyalgia, tobacco use, GERD, pulmonary nodules who presented as a new consult.    She had a PET scan done in 2024 for further evaluation of pulmonary nodule seen on CT chest.  The PET scan showed multiple masses in her lungs, left breast and abdomen.  Abdominal portion of the " "report showed \" Focal area of FDG avid soft tissue uptake in the right lower quadrant abdominal wall with associated soft tissue nodule and SUV max of 8.9.  This is adjacent to an area of superficial concave defect.  This is new from abdominal CT 9/11/2020\".  She reports GI symptoms of abdominal bloating and generalized abdominal pain.  She does have chronic constipation and has difficulty having bowel movements without the use of laxatives.  She also reports ongoing symptoms of bloating, chronic nausea, acid reflux and intermittent sharp right-sided abdominal pain that is worse when she has to have bowel bowel movements and relieved after she has bowel movement..  She she lost 15 pounds when her symptoms were pretty bad between.  Her weight is now stable.  She also had night sweats at that time but they are now gone.  No family history of colon cancer or gastrointestinal cancer.  No prior colonoscopy.  Grandfather has a history of celiac disease.  Smokes 1 pack cigarettes per day    She does have fibromyalgia and generalized joint pain for many years.  She has followed with rheumatology in the past and has tried multiple medications including gabapentin, Lyrica, Mobic, ibuprofen, Cymbalta, Cevela.  She had some improvement with steroids and tramadol.      She has seen a pulmonologist who ordered a CT chest wo contrast for further evaluation of PET scan findings.  She also had a CT abdomen and pelvis ordered but not scheduled.      Gastric cancer in great grandmother and aunt.      HPI  History obtained from: patient  Review of Systems A complete review of systems is negative other than that noted above in the HPI.         Objective   There were no vitals taken for this visit.    Physical Exam  Vitals and nursing note reviewed.   Constitutional:       General: She is not in acute distress.     Appearance: She is well-developed.   HENT:      Head: Normocephalic and atraumatic.   Eyes:      Conjunctiva/sclera: " Conjunctivae normal.   Cardiovascular:      Rate and Rhythm: Normal rate and regular rhythm.      Heart sounds: No murmur heard.  Pulmonary:      Effort: Pulmonary effort is normal. No respiratory distress.      Breath sounds: Normal breath sounds.   Abdominal:      Palpations: Abdomen is soft.      Tenderness: There is no abdominal tenderness.   Musculoskeletal:         General: No swelling.      Cervical back: Neck supple.   Skin:     General: Skin is warm and dry.      Capillary Refill: Capillary refill takes less than 2 seconds.   Neurological:      Mental Status: She is alert.   Psychiatric:         Mood and Affect: Mood normal.            Lab Results: I personally reviewed relevant lab results.     Radiology Results Review: I have reviewed the following images/report studies in PACS: PET scan       Caridad Concepcion MD  Gastroenterology Fellow, PGY- 4  Available on EPIC Secure Chat  3/19/2025 6:14 AM

## 2025-03-19 NOTE — ASSESSMENT & PLAN NOTE
Patient with persistent ongoing symptoms of bloating, chronic nausea, acid reflux and intermittent sharp right-sided abdominal pain. No improvement with  BID famotidine and gas x    Plan  - Plan for EGD with celiac and H pylori biopsy  - Pending EGD results, trial PPI afterwards

## 2025-03-19 NOTE — PATIENT INSTRUCTIONS
Scheduled date of EGD/colonoscopy (as of today): 4/11/2025  Physician performing EGD/colonoscopy: Dr. ALEXANDER Santana  Location of EGD/colonoscopy: [place]  Desired bowel prep reviewed with patient: Golytely 2- day prep  Instructions reviewed with patient by: Jaylon- given to pt at 3/19/25 ov  Clearances:  N/A    8WKS F/U scheduled on 5/7/25 with Dr. Concepcion in [place].

## 2025-03-27 ENCOUNTER — HOSPITAL ENCOUNTER (OUTPATIENT)
Dept: CT IMAGING | Facility: HOSPITAL | Age: 50
Discharge: HOME | End: 2025-03-27
Payer: COMMERCIAL

## 2025-03-27 DIAGNOSIS — R91.8 LUNG NODULES: ICD-10-CM

## 2025-03-27 PROCEDURE — 71250 CT THORAX DX C-: CPT

## 2025-03-28 ENCOUNTER — ANESTHESIA (OUTPATIENT)
Dept: ANESTHESIOLOGY | Facility: HOSPITAL | Age: 50
End: 2025-03-28

## 2025-03-28 ENCOUNTER — ANESTHESIA EVENT (OUTPATIENT)
Dept: ANESTHESIOLOGY | Facility: HOSPITAL | Age: 50
End: 2025-03-28

## 2025-04-07 DIAGNOSIS — F17.210 CIGARETTE NICOTINE DEPENDENCE WITHOUT COMPLICATION: ICD-10-CM

## 2025-04-07 RX ORDER — VARENICLINE TARTRATE 0.5 MG/1
TABLET, FILM COATED ORAL
Qty: 95 TABLET | Refills: 0 | Status: SHIPPED | OUTPATIENT
Start: 2025-04-07 | End: 2025-05-05

## 2025-04-15 ENCOUNTER — OFFICE VISIT (OUTPATIENT)
Dept: RHEUMATOLOGY | Facility: CLINIC | Age: 50
End: 2025-04-15
Payer: COMMERCIAL

## 2025-04-15 VITALS — SYSTOLIC BLOOD PRESSURE: 126 MMHG | DIASTOLIC BLOOD PRESSURE: 82 MMHG | HEIGHT: 67 IN | BODY MASS INDEX: 26.31 KG/M2

## 2025-04-15 DIAGNOSIS — M79.7 FIBROMYALGIA: ICD-10-CM

## 2025-04-15 PROCEDURE — 99204 OFFICE O/P NEW MOD 45 MIN: CPT | Performed by: INTERNAL MEDICINE

## 2025-04-15 RX ORDER — VALACYCLOVIR HYDROCHLORIDE 1 G/1
1000 TABLET, FILM COATED ORAL EVERY 8 HOURS
COMMUNITY
Start: 2025-03-18

## 2025-04-15 RX ORDER — PREDNISONE 20 MG/1
TABLET ORAL
COMMUNITY
Start: 2025-03-24

## 2025-04-15 RX ORDER — GABAPENTIN 300 MG/1
1 CAPSULE ORAL 2 TIMES DAILY
COMMUNITY
Start: 2025-03-18

## 2025-04-15 NOTE — PROGRESS NOTES
Name: Cara Hunt      : 1975      MRN: [id number]  Encounter Provider: Abhijeet Hernandez MD  Encounter Date: 4/15/2025   Encounter department: [facility] RHEUMATOLOGY [facility]  :  Assessment & Plan  Fibromyalgia  Chronic arthralgias, myalgias, fatigue and increased sensitivity to touch suggestive fibromyalgia    I have reviewed fibromyalgia and recommended online resource [facility] Fibroguide website    I have advised her rheumatology here consultative only and further management/recommendations per primary provider and/or pain management    Would have low threshold to r/o sleep related disorders    Recommend low impact aerobics, yoga, love chi, aqua therapy    Could consider FDA approved medications such as Cymbalta, Savellla, Lyrica or other off label medications such as Gabapentin, Flexeril, Elavil per discretion of primary health care provider    At this time low index of suspicion for inflammatory/autoimmune rheumatic disorders    Serologies as ordered per PCP pending    Will review test results and f/u if any concern for inflammatory rheumatic disorder.    Please follow up with your PCP for Fibromyalgia care      Thank you for involving me in this patient's care.    Abhijeet Hernandez MD  [facility] Rheumatology    Orders:    Ambulatory Referral to Rheumatology    Cyclic citrul peptide antibody, IgG; Future      This is a Rheumatology Consult seen at the request of Virgie ELLIS    History of Present Illness   HPI  Cara Hunt is a 50 y.o. female who presents for further evaluation fibromyalgia  History obtained from: patient    She has past medical history abdominal pain, pulmonary nodules, chronic fibromyalgia, chronic neck and back pain.    She has history of Pulmonary nodules and is being closely monitored per Pulmonary specialist. PET scan w/u and also biopsy is being considered    Was told 19 years ago she has fibromyalgia per a rheumatologist    Chronic  "arthralgias and myalgias, fatigue and poor sleep    She does not feel refreshed after \"14 hours sleep\"    + Increased sensitivity to touch, \" I don't want anyone to touch me\"      Review of Systems  Pertinent Medical History       --------------------------------------------------------------------------------------------------------        ROS:        - for: Fevers, Chills or sweats.  No HAs or scalp tenderness.  No jaw claudication.  No acute visual or eye changes.  No dry eyes.  No auditory complaints.  No oral lesions or ulcers.  No dry mouth.  No sore throat or cough.  No chest pains or palpitations.  No shortness of breath, dyspnea on exertion or wheezing.  No hemotpysis.  No abdominal pain, GERD symptoms, diarrhea or constipation.  No urinary complaints.  No numbness, tingling or weakness.  No rashes.    All other ROS was reviewed and negative except as above         --------------------------------------------------------------------------------------------------------    Past Medical History    Past Medical History:   Diagnosis Date    Abdominal bloating     Anemia     Anesthesia complication     difficulty awakening    Anxiety     Arthritis     spine    Chronic pain disorder     back    Degenerative disc disease, thoracic     Excessive and frequent menstruation with regular cycle     Fibromyalgia     Hearing decreased     left ear    History of transfusion     itching after transfusion- had 2wks ago    Nephrotic syndrome     in childhood ages 1.5- 8 years old    Osteoarthritis     Renal disorder     acute nephritis    Seasonal allergies     Spondylisthesis            Past Surgical History    Past Surgical History:   Procedure Laterality Date    NASAL SEPTUM SURGERY      NO PAST SURGERIES      TX CYSTOURETHROSCOPY N/A 9/24/2020    Procedure: CYSTO;  Surgeon: Virginia Rivas DO;  Location: [facility];  Service: Gynecology    TX LAPS TOTAL HYSTERECT 250 GM/< W/RMVL TUBE/OVARY N/A 9/24/2020    Procedure: LTH " W/BILAT SALPINGECTOMY;  Surgeon: Virginia Rivas DO;  Location: [facility];  Service: Gynecology    WISDOM TOOTH EXTRACTION             Family History    Family History   Problem Relation Age of Onset    Melanoma Mother     Tremor Mother     Asthma Mother     Charcot-Cindy-Tooth disease Father     Heart failure Maternal Grandmother     Hypertension Maternal Grandmother     Cancer Maternal Grandfather     Glaucoma Paternal Grandfather       Cousin lupus  Brother has psoriasis  Mother fibromyalgia      Social History    Social History     Tobacco Use    Smoking status: Every Day     Current packs/day: 1.00     Average packs/day: 1 pack/day for 30.0 years (30.0 ttl pk-yrs)     Types: Cigarettes    Smokeless tobacco: Never    Tobacco comments:     pt quit last week   Vaping Use    Vaping status: Never Used   Substance Use Topics    Alcohol use: Never    Drug use: Yes     Types: Marijuana     Comment: Medical     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 pt thinks (possibly atrovent.)    Zithromax [Azithromycin] Anaphylaxis    Bactrim [Sulfamethoxazole-Trimethoprim]     Quinolones     Meloxicam Edema    Meloxicam Swelling    Quinolones Hallucinations and Myalgia    Flavoring Agent Rash    Shellfish-Derived Products - Food Allergy Rash         Medications    Current Outpatient Medications   Medication Instructions    acetaminophen (TYLENOL) 650 mg, Oral, Every 6 hours PRN    amoxicillin-clavulanate (AUGMENTIN) 875-125 mg per tablet 1 tablet, 2 times daily    b complex vitamins capsule 1 capsule, Daily    bisacodyl (DULCOLAX) 10 mg, Oral, 2 times daily    buPROPion (WELLBUTRIN XL) 300 mg    cephalexin (KEFLEX) 100 mg, 2 times daily    cetirizine  "(ZYRTEC) 5 mg, Daily    cetirizine-pseudoephedrine (ZyrTEC-D) 5-120 MG per tablet 1 tablet, Daily    cholecalciferol (VITAMIN D3) 5,000 Units, Daily    Cholecalciferol (VITAMIN D3) 5,000 Units, Daily    Cranberry 200 MG CAPS 2 times daily    famotidine (PEPCID) 40 mg, 2 times daily    ferrous sulfate 325 mg, Daily with breakfast    fluticasone-umeclidinium-vilanterol (Trelegy Ellipta) 100-62.5-25 mcg/actuation inhaler 1 puff, Inhalation, Daily, Rinse mouth after use.    gabapentin (NEURONTIN) 300 mg capsule 1 capsule, 2 times daily    ibuprofen (MOTRIN) 600 mg, Oral, Every 6 hours PRN    ibuprofen (MOTRIN) 200-800 mg, Every 6 hours PRN    magnesium gluconate (MAGONATE) 500 mg, Daily at bedtime    methocarbamol (ROBAXIN) 750 mg tablet TAKE 2 TABLET BY MOUTH EVERY EIGHT HOURS AS NEEDED FOR PAIN FOR MUSCLE SPASM.    Naproxen Sodium (ALEVE PO) 2 times daily PRN    polyethylene glycol (Golytely) 4000 mL solution 4,000 mL, Oral, Once, Take 4000 mL by mouth once for 1 dose. Use as directed    predniSONE 20 mg tablet TAKE 3 TABLETS ONCE DAILY FOR 3 DAYS, THEN 2 TABS ONCE DAILY X3 DAYS, THEN 1 TAB ONCE DAILY X3 DAYS.    pseudoephedrine (SUDAFED) 120 mg, Every 12 hours    simethicone (MYLICON) 80 mg, 2 times daily    traMADol (ULTRAM) 50 mg tablet TAKE 1 TABLET BY MOUTH EVERY EIGHT HOURS AS NEEDED FOR PAIN FOR SEVERE PAIN.    Tranexamic Acid 650 MG TABS As needed    valACYclovir (VALTREX) 1,000 mg, Every 8 hours    varenicline (CHANTIX) 0.5 mg tablet TAKE 1 TABLET (0.5 MG TOTAL) BY MOUTH DAILY FOR 3 DAYS, THEN 1 TABLET (0.5 MG TOTAL) 2 (TWO) TIMES A DAY FOR 4 DAYS, THEN 2 TABLETS (1 MG TOTAL) 2 (TWO) TIMES A DAY FOR 21 DAYS.    venlafaxine (EFFEXOR) 37.5 mg, 2 times daily            ________________________________________________________________________  Results Review                 Objective   /82   Ht 5' 7\" (1.702 m)   BMI 26.31 kg/m²      Physical Exam      GEN: AAO, No apparent distress.  Patient is well " developed.  HEENT:  Pupils are equal, round and reactive.  Sclera are clear.  Fundoscopic exam is normal.  External ears are without lesions.  Oral pharynx is clear of ulcers or other lesions.  MMM.   NECK:  Supple.  There is no adenopathy appreciable in anterior or posterior cervical chains or supraclavicularly.  JVP is normal.    HEART: Regular rate and rhythm.  There is no appreciable murmur, gallop or rub.  LUNGS: Clear to auscultation.  ABD:  Soft, without tenderness, rebound or guarding.  No appreciable organomegally.  NEURO: Speech and cognition are normal.  Strength is 5/5 throughout.  Tone is normal.  DTRs are 2/4 at the knees, ankles and elbows.  Gait is normal.  SKIN: There are no rashes or lesions    MUSCULOSKELETAL:   + tender points of fibromyalgia      Thank you for involving me in this patient's care.        Abhijeet Hernandez MD  [facility] Rheumatology

## 2025-04-15 NOTE — ASSESSMENT & PLAN NOTE
Chronic arthralgias, myalgias, fatigue and increased sensitivity to touch suggestive fibromyalgia    I have reviewed fibromyalgia and recommended online resource [facility] Fibroguide website    I have advised her rheumatology here consultative only and further management/recommendations per primary provider and/or pain management    Would have low threshold to r/o sleep related disorders    Recommend low impact aerobics, yoga, love chi, aqua therapy    Could consider FDA approved medications such as Cymbalta, Savellla, Lyrica or other off label medications such as Gabapentin, Flexeril, Elavil per discretion of primary health care provider    At this time low index of suspicion for inflammatory/autoimmune rheumatic disorders    Serologies as ordered per PCP pending    Will review test results and f/u if any concern for inflammatory rheumatic disorder.    Please follow up with your PCP for Fibromyalgia care      Thank you for involving me in this patient's care.    Abhijeet Hernandez MD  [facility] Rheumatology    Orders:    Ambulatory Referral to Rheumatology    Cyclic citrul peptide antibody, IgG; Future

## 2025-04-25 DIAGNOSIS — F17.210 CIGARETTE NICOTINE DEPENDENCE WITHOUT COMPLICATION: ICD-10-CM

## 2025-04-25 RX ORDER — VARENICLINE TARTRATE 0.5 MG/1
TABLET, FILM COATED ORAL
Qty: 285 TABLET | Refills: 1 | Status: SHIPPED | OUTPATIENT
Start: 2025-04-25

## 2025-06-15 DIAGNOSIS — J41.0 SIMPLE CHRONIC BRONCHITIS (HCC): ICD-10-CM

## 2025-06-16 RX ORDER — FLUTICASONE FUROATE, UMECLIDINIUM BROMIDE AND VILANTEROL TRIFENATATE 100; 62.5; 25 UG/1; UG/1; UG/1
1 POWDER RESPIRATORY (INHALATION) DAILY
Qty: 60 EACH | Refills: 5 | Status: SHIPPED | OUTPATIENT
Start: 2025-06-16 | End: 2025-09-14

## (undated) DEVICE — 3000CC GUARDIAN II: Brand: GUARDIAN

## (undated) DEVICE — DRAPE SURGIKIT SADDLE BAG LAP

## (undated) DEVICE — LIGASURE LAP SLR/DIV MARYLAND 5MM

## (undated) DEVICE — GLOVE INDICATOR PI UNDERGLOVE SZ 7 BLUE

## (undated) DEVICE — UTERINE MANIPULATOR RUMI 6.7 X 10 CM

## (undated) DEVICE — BLUE HEAT SCOPE WARMER

## (undated) DEVICE — PMI DISPOSABLE PUNCTURE CLOSURE DEVICE / SUTURE GRASPER: Brand: PMI

## (undated) DEVICE — TRAY FOLEY 16FR URIMETER SILICONE SURESTEP

## (undated) DEVICE — SYRINGE 30ML LL

## (undated) DEVICE — SUT VICRYL 0 UR-6 27 IN J603H

## (undated) DEVICE — TROCAR: Brand: KII FIOS FIRST ENTRY

## (undated) DEVICE — SUT MONOCRYL 4-0 PS-2 27 IN Y426H

## (undated) DEVICE — LAPAROSCOPIC SMOKE EVAC TUBING

## (undated) DEVICE — ENDO STITCH DEVICE 10 MM

## (undated) DEVICE — UNDYED BRAIDED (POLYGLACTIN 910), SYNTHETIC ABSORBABLE SUTURE: Brand: COATED VICRYL

## (undated) DEVICE — ENDOPATH XCEL UNIVERSAL TROCAR STABLILITY SLEEVES: Brand: ENDOPATH XCEL

## (undated) DEVICE — RUMI II KOH EFFICIENT 3CM F/ELECTROCAUTERY

## (undated) DEVICE — GLOVE PI ULTRA TOUCH SZ.7.0

## (undated) DEVICE — SYRINGE 50ML LL

## (undated) DEVICE — CHLORAPREP HI-LITE 26ML ORANGE

## (undated) DEVICE — GLOVE PI ULTRA TOUCH SZ.7.5

## (undated) DEVICE — PREMIUM DRY TRAY LF: Brand: MEDLINE INDUSTRIES, INC.

## (undated) DEVICE — SUT ENDOSCOPIC STITCH 0 170053

## (undated) DEVICE — Device

## (undated) DEVICE — INTENDED FOR TISSUE SEPARATION, AND OTHER PROCEDURES THAT REQUIRE A SHARP SURGICAL BLADE TO PUNCTURE OR CUT.: Brand: BARD-PARKER SAFETY BLADES SIZE 11, STERILE

## (undated) DEVICE — ENDOPATH 5MM CURVED SCISSORS WITH MONOPOLAR CAUTERY: Brand: ENDOPATH

## (undated) DEVICE — IV EXTENSION TUBING 33 IN

## (undated) DEVICE — BETHLEHEM UNIVERSAL GYN LAP PK: Brand: CARDINAL HEALTH

## (undated) DEVICE — IRRIG ENDO FLO TUBING

## (undated) DEVICE — ELECTRODE LAP J HOOK E-Z CLEAN 33CM-0021

## (undated) DEVICE — TROCAR: Brand: KII® SLEEVE

## (undated) DEVICE — ENDOPATH XCEL BLADELESS TROCARS WITH STABILITY SLEEVES: Brand: ENDOPATH XCEL

## (undated) DEVICE — SCD SEQUENTIAL COMPRESSION COMFORT SLEEVE MEDIUM KNEE LENGTH: Brand: KENDALL SCD

## (undated) DEVICE — ADHESIVE SKIN HIGH VISCOSITY EXOFIN 1ML

## (undated) DEVICE — MAYO STAND COVER: Brand: CONVERTORS